# Patient Record
Sex: MALE | Race: WHITE | NOT HISPANIC OR LATINO | Employment: FULL TIME | URBAN - METROPOLITAN AREA
[De-identification: names, ages, dates, MRNs, and addresses within clinical notes are randomized per-mention and may not be internally consistent; named-entity substitution may affect disease eponyms.]

---

## 2018-11-02 ENCOUNTER — HOSPITAL ENCOUNTER (EMERGENCY)
Facility: HOSPITAL | Age: 48
Discharge: HOME | End: 2018-11-02
Attending: EMERGENCY MEDICINE | Admitting: EMERGENCY MEDICINE
Payer: COMMERCIAL

## 2018-11-02 VITALS
HEART RATE: 95 BPM | HEIGHT: 70 IN | SYSTOLIC BLOOD PRESSURE: 129 MMHG | DIASTOLIC BLOOD PRESSURE: 83 MMHG | WEIGHT: 170 LBS | OXYGEN SATURATION: 96 % | BODY MASS INDEX: 24.34 KG/M2 | TEMPERATURE: 98.1 F | RESPIRATION RATE: 16 BRPM

## 2018-11-02 DIAGNOSIS — S60.351A FOREIGN BODY OF RIGHT THUMB, INITIAL ENCOUNTER: Primary | ICD-10-CM

## 2018-11-02 PROCEDURE — 99283 EMERGENCY DEPT VISIT LOW MDM: CPT

## 2018-11-02 RX ORDER — CEPHALEXIN 250 MG/1
500 CAPSULE ORAL ONCE
Status: COMPLETED | OUTPATIENT
Start: 2018-11-02 | End: 2018-11-02

## 2018-11-02 RX ORDER — CEPHALEXIN 500 MG/1
500 CAPSULE ORAL EVERY 6 HOURS SCHEDULED
Qty: 40 CAPSULE | Refills: 0 | Status: SHIPPED | OUTPATIENT
Start: 2018-11-02 | End: 2018-11-12

## 2018-11-02 RX ORDER — LIDOCAINE HYDROCHLORIDE 20 MG/ML
10 INJECTION, SOLUTION EPIDURAL; INFILTRATION; INTRACAUDAL; PERINEURAL ONCE
Status: COMPLETED | OUTPATIENT
Start: 2018-11-02 | End: 2018-11-02

## 2018-11-02 RX ADMIN — LIDOCAINE HYDROCHLORIDE 10 ML: 20 INJECTION, SOLUTION EPIDURAL; INFILTRATION; INTRACAUDAL; PERINEURAL at 14:29

## 2018-11-02 RX ADMIN — CEPHALEXIN 500 MG: 250 CAPSULE ORAL at 14:29

## 2018-11-02 NOTE — DISCHARGE INSTRUCTIONS
Leave the wound open anything that wants to come out leg come out  Keflex every 6 hours to fight infection  Return increasing redness swelling, if the swelling continues to go up your arm  Return any problems    Cellulitis   WHAT YOU NEED TO KNOW:   Cellulitis is a skin infection caused by bacteria  Cellulitis may go away on its own or you may need treatment  Your healthcare provider may draw a [place] around the outside edges of your cellulitis  If your cellulitis spreads, your healthcare provider will see it outside of the [place]  DISCHARGE INSTRUCTIONS:   Call 911 if:   · You have sudden trouble breathing or chest pain  Return to the emergency department if:   · Your wound gets larger and more painful  · You feel a crackling under your skin when you touch it  · You have purple dots or bumps on your skin, or you see bleeding under your skin  · You have new swelling and pain in your legs  · The red, warm, swollen area gets larger  · You see red streaks coming from the infected area  Contact your healthcare provider if:   · You have a fever  · Your fever or pain does not go away or gets worse  · The area does not get smaller after 2 days of antibiotics  · Your skin is flaking or peeling off  · You have questions or concerns about your condition or care  Medicines:   · Antibiotics  help treat the bacterial infection  · NSAIDs , such as ibuprofen, help decrease swelling, pain, and fever  NSAIDs can cause stomach bleeding or kidney problems in certain people  If you take blood thinner medicine, always ask if NSAIDs are safe for you  Always read the medicine label and follow directions  Do not give these medicines to children under 10months of age without direction from your child's healthcare provider  · Acetaminophen  decreases pain and fever  It is available without a doctor's order  Ask how much to take and how often to take it  Follow directions   Read the labels of all other medicines you are using to see if they also contain acetaminophen, or ask your doctor or pharmacist  Acetaminophen can cause liver damage if not taken correctly  Do not use more than 4 grams (4,000 milligrams) total of acetaminophen in one day  · Take your medicine as directed  Contact your healthcare provider if you think your medicine is not helping or if you have side effects  Tell him or her if you are allergic to any medicine  Keep a list of the medicines, vitamins, and herbs you take  Include the amounts, and when and why you take them  Bring the list or the pill bottles to follow-up visits  Carry your medicine list with you in case of an emergency  Self-care:   · Elevate the area above the level of your heart  as often as you can  This will help decrease swelling and pain  Prop the area on pillows or blankets to keep it elevated comfortably  · Clean the area daily until the wound scabs over  Gently wash the area with soap and water  Pat dry  Use dressings as directed  · Place cool or warm, wet cloths on the area as directed  Use clean cloths and clean water  Leave it on the area until the cloth is room temperature  Pat the area dry with a clean, dry cloth  The cloths may help decrease pain  Prevent cellulitis:   · Do not scratch bug bites or areas of injury  You increase your risk for cellulitis by scratching these areas  · Do not share personal items, such as towels, clothing, and razors  · Clean exercise equipment  with germ-killing  before and after you use it  · Wash your hands often  Use soap and water  Wash your hands after you use the bathroom, change a child's diapers, or sneeze  Wash your hands before you prepare or eat food  Use lotion to prevent dry, cracked skin  · Wear pressure stockings as directed  You may be told to wear the stockings if you have peripheral edema  The stockings improve blood flow and decrease swelling      · Treat athlete's foot   This can help prevent the spread of a bacterial skin infection  Follow up with your healthcare provider within 3 days, or as directed: Your healthcare provider will check if your cellulitis is getting better  You may need different medicine  Write down your questions so you remember to ask them during your visits  © 2017 [employer] Information is for End User's use only and may not be sold, redistributed or otherwise used for commercial purposes  All illustrations and images included in CareNotes® are the copyrighted property of [employer] , [employer]  or [place]  The above information is an  only  It is not intended as medical advice for individual conditions or treatments  Talk to your doctor, nurse or pharmacist before following any medical regimen to see if it is safe and effective for you

## 2018-11-02 NOTE — ED NOTES
Discharge instructions and medications reviewed with pt  Pt verbalized understanding, with no further questions at this time  Pt ambulatory out of department, using steady gait, without assistance, with wife       Joshua Davila RN  11/02/18 1666

## 2019-06-20 ENCOUNTER — APPOINTMENT (EMERGENCY)
Dept: CT IMAGING | Facility: HOSPITAL | Age: 49
End: 2019-06-20
Payer: COMMERCIAL

## 2019-06-20 ENCOUNTER — HOSPITAL ENCOUNTER (EMERGENCY)
Facility: HOSPITAL | Age: 49
Discharge: HOME | End: 2019-06-20
Attending: EMERGENCY MEDICINE | Admitting: EMERGENCY MEDICINE
Payer: COMMERCIAL

## 2019-06-20 VITALS
WEIGHT: 169.97 LBS | RESPIRATION RATE: 16 BRPM | HEART RATE: 70 BPM | DIASTOLIC BLOOD PRESSURE: 79 MMHG | BODY MASS INDEX: 24.33 KG/M2 | HEIGHT: 70 IN | TEMPERATURE: 98 F | OXYGEN SATURATION: 97 % | SYSTOLIC BLOOD PRESSURE: 126 MMHG

## 2019-06-20 DIAGNOSIS — N20.0 KIDNEY STONE: Primary | ICD-10-CM

## 2019-06-20 DIAGNOSIS — N13.30 HYDRONEPHROSIS: ICD-10-CM

## 2019-06-20 DIAGNOSIS — R31.9 HEMATURIA: ICD-10-CM

## 2019-06-20 LAB
ALBUMIN SERPL BCP-MCNC: 3.9 G/DL (ref 3.5–5)
ALP SERPL-CCNC: 63 U/L (ref 46–116)
ALT SERPL W P-5'-P-CCNC: 25 U/L (ref 12–78)
ANION GAP SERPL CALCULATED.3IONS-SCNC: 11 MMOL/L (ref 4–13)
APTT PPP: 29 SECONDS (ref 26–38)
AST SERPL W P-5'-P-CCNC: 23 U/L (ref 5–45)
BACTERIA UR QL AUTO: ABNORMAL /HPF
BASOPHILS # BLD AUTO: 0.05 THOUSANDS/ΜL (ref 0–0.1)
BASOPHILS NFR BLD AUTO: 1 % (ref 0–1)
BILIRUB DIRECT SERPL-MCNC: 0.21 MG/DL (ref 0–0.2)
BILIRUB SERPL-MCNC: 1 MG/DL (ref 0.2–1)
BILIRUB UR QL STRIP: ABNORMAL
BUN SERPL-MCNC: 15 MG/DL (ref 5–25)
CALCIUM SERPL-MCNC: 9.3 MG/DL (ref 8.3–10.1)
CHLORIDE SERPL-SCNC: 103 MMOL/L (ref 100–108)
CLARITY UR: ABNORMAL
CO2 SERPL-SCNC: 24 MMOL/L (ref 21–32)
COLOR UR: YELLOW
CREAT SERPL-MCNC: 1.34 MG/DL (ref 0.6–1.3)
EOSINOPHIL # BLD AUTO: 0.04 THOUSAND/ΜL (ref 0–0.61)
EOSINOPHIL NFR BLD AUTO: 0 % (ref 0–6)
ERYTHROCYTE [DISTWIDTH] IN BLOOD BY AUTOMATED COUNT: 12.7 % (ref 11.6–15.1)
GFR SERPL CREATININE-BSD FRML MDRD: 61 ML/MIN/1.73SQ M
GLUCOSE SERPL-MCNC: 112 MG/DL (ref 65–140)
GLUCOSE UR STRIP-MCNC: NEGATIVE MG/DL
HCT VFR BLD AUTO: 44.9 % (ref 36.5–49.3)
HGB BLD-MCNC: 15.7 G/DL (ref 12–17)
HGB UR QL STRIP.AUTO: ABNORMAL
IMM GRANULOCYTES # BLD AUTO: 0.03 THOUSAND/UL (ref 0–0.2)
IMM GRANULOCYTES NFR BLD AUTO: 0 % (ref 0–2)
INR PPP: 1.1 (ref 0.86–1.17)
KETONES UR STRIP-MCNC: ABNORMAL MG/DL
LEUKOCYTE ESTERASE UR QL STRIP: NEGATIVE
LIPASE SERPL-CCNC: 86 U/L (ref 73–393)
LYMPHOCYTES # BLD AUTO: 1.14 THOUSANDS/ΜL (ref 0.6–4.47)
LYMPHOCYTES NFR BLD AUTO: 13 % (ref 14–44)
MCH RBC QN AUTO: 35.4 PG (ref 26.8–34.3)
MCHC RBC AUTO-ENTMCNC: 35 G/DL (ref 31.4–37.4)
MCV RBC AUTO: 101 FL (ref 82–98)
MONOCYTES # BLD AUTO: 0.68 THOUSAND/ΜL (ref 0.17–1.22)
MONOCYTES NFR BLD AUTO: 8 % (ref 4–12)
MUCOUS THREADS UR QL AUTO: ABNORMAL
NEUTROPHILS # BLD AUTO: 7.01 THOUSANDS/ΜL (ref 1.85–7.62)
NEUTS SEG NFR BLD AUTO: 78 % (ref 43–75)
NITRITE UR QL STRIP: NEGATIVE
NON-SQ EPI CELLS URNS QL MICRO: ABNORMAL /HPF
NRBC BLD AUTO-RTO: 0 /100 WBCS
PH UR STRIP.AUTO: 6.5 [PH]
PLATELET # BLD AUTO: 195 THOUSANDS/UL (ref 149–390)
PMV BLD AUTO: 9.6 FL (ref 8.9–12.7)
POTASSIUM SERPL-SCNC: 3.9 MMOL/L (ref 3.5–5.3)
PROT SERPL-MCNC: 7.5 G/DL (ref 6.4–8.2)
PROT UR STRIP-MCNC: ABNORMAL MG/DL
PROTHROMBIN TIME: 14.1 SECONDS (ref 11.8–14.2)
RBC # BLD AUTO: 4.44 MILLION/UL (ref 3.88–5.62)
RBC #/AREA URNS AUTO: ABNORMAL /HPF
SODIUM SERPL-SCNC: 138 MMOL/L (ref 136–145)
SP GR UR STRIP.AUTO: 1.02 (ref 1–1.03)
UROBILINOGEN UR QL STRIP.AUTO: 1 E.U./DL
WBC # BLD AUTO: 8.95 THOUSAND/UL (ref 4.31–10.16)
WBC #/AREA URNS AUTO: ABNORMAL /HPF

## 2019-06-20 PROCEDURE — 99284 EMERGENCY DEPT VISIT MOD MDM: CPT

## 2019-06-20 PROCEDURE — 81001 URINALYSIS AUTO W/SCOPE: CPT | Performed by: EMERGENCY MEDICINE

## 2019-06-20 PROCEDURE — 85025 COMPLETE CBC W/AUTO DIFF WBC: CPT | Performed by: EMERGENCY MEDICINE

## 2019-06-20 PROCEDURE — 96360 HYDRATION IV INFUSION INIT: CPT

## 2019-06-20 PROCEDURE — 99284 EMERGENCY DEPT VISIT MOD MDM: CPT | Performed by: EMERGENCY MEDICINE

## 2019-06-20 PROCEDURE — 74176 CT ABD & PELVIS W/O CONTRAST: CPT

## 2019-06-20 PROCEDURE — 80076 HEPATIC FUNCTION PANEL: CPT | Performed by: EMERGENCY MEDICINE

## 2019-06-20 PROCEDURE — 36415 COLL VENOUS BLD VENIPUNCTURE: CPT | Performed by: EMERGENCY MEDICINE

## 2019-06-20 PROCEDURE — 85610 PROTHROMBIN TIME: CPT | Performed by: EMERGENCY MEDICINE

## 2019-06-20 PROCEDURE — 83690 ASSAY OF LIPASE: CPT | Performed by: EMERGENCY MEDICINE

## 2019-06-20 PROCEDURE — 80048 BASIC METABOLIC PNL TOTAL CA: CPT | Performed by: EMERGENCY MEDICINE

## 2019-06-20 PROCEDURE — 85730 THROMBOPLASTIN TIME PARTIAL: CPT | Performed by: EMERGENCY MEDICINE

## 2019-06-20 RX ORDER — OXYCODONE HYDROCHLORIDE AND ACETAMINOPHEN 5; 325 MG/1; MG/1
1 TABLET ORAL EVERY 4 HOURS PRN
Qty: 15 TABLET | Refills: 0 | Status: SHIPPED | OUTPATIENT
Start: 2019-06-20 | End: 2019-06-28 | Stop reason: HOSPADM

## 2019-06-20 RX ORDER — TAMSULOSIN HYDROCHLORIDE 0.4 MG/1
0.4 CAPSULE ORAL
Qty: 10 CAPSULE | Refills: 0 | Status: SHIPPED | OUTPATIENT
Start: 2019-06-20

## 2019-06-20 RX ORDER — OXYCODONE HYDROCHLORIDE AND ACETAMINOPHEN 5; 325 MG/1; MG/1
1 TABLET ORAL ONCE
Status: COMPLETED | OUTPATIENT
Start: 2019-06-20 | End: 2019-06-20

## 2019-06-20 RX ADMIN — OXYCODONE HYDROCHLORIDE AND ACETAMINOPHEN 1 TABLET: 5; 325 TABLET ORAL at 09:36

## 2019-06-20 RX ADMIN — SODIUM CHLORIDE 1000 ML: 0.9 INJECTION, SOLUTION INTRAVENOUS at 09:42

## 2019-06-26 ENCOUNTER — APPOINTMENT (EMERGENCY)
Dept: CT IMAGING | Facility: HOSPITAL | Age: 49
End: 2019-06-26
Payer: COMMERCIAL

## 2019-06-26 ENCOUNTER — HOSPITAL ENCOUNTER (OUTPATIENT)
Facility: HOSPITAL | Age: 49
Setting detail: OBSERVATION
Discharge: HOME | End: 2019-06-28
Attending: EMERGENCY MEDICINE | Admitting: GENERAL PRACTICE
Payer: COMMERCIAL

## 2019-06-26 DIAGNOSIS — R10.9 FLANK PAIN: ICD-10-CM

## 2019-06-26 DIAGNOSIS — N17.9 AKI (ACUTE KIDNEY INJURY) (HCC): ICD-10-CM

## 2019-06-26 DIAGNOSIS — N13.30 HYDRONEPHROSIS: ICD-10-CM

## 2019-06-26 DIAGNOSIS — N20.1 URETEROLITHIASIS: Primary | ICD-10-CM

## 2019-06-26 PROBLEM — N20.0 NEPHROLITHIASIS: Status: ACTIVE | Noted: 2019-06-26

## 2019-06-26 LAB
ALBUMIN SERPL BCP-MCNC: 4.1 G/DL (ref 3.5–5)
ALP SERPL-CCNC: 72 U/L (ref 46–116)
ALT SERPL W P-5'-P-CCNC: 23 U/L (ref 12–78)
ANION GAP SERPL CALCULATED.3IONS-SCNC: 14 MMOL/L (ref 4–13)
AST SERPL W P-5'-P-CCNC: 26 U/L (ref 5–45)
BACTERIA UR QL AUTO: ABNORMAL /HPF
BASOPHILS # BLD AUTO: 0.08 THOUSANDS/ΜL (ref 0–0.1)
BASOPHILS NFR BLD AUTO: 1 % (ref 0–1)
BILIRUB SERPL-MCNC: 0.8 MG/DL (ref 0.2–1)
BILIRUB UR QL STRIP: NEGATIVE
BUN SERPL-MCNC: 26 MG/DL (ref 5–25)
CALCIUM SERPL-MCNC: 9.7 MG/DL (ref 8.3–10.1)
CAOX CRY URNS QL MICRO: ABNORMAL /HPF
CHLORIDE SERPL-SCNC: 98 MMOL/L (ref 100–108)
CLARITY UR: ABNORMAL
CO2 SERPL-SCNC: 21 MMOL/L (ref 21–32)
COLOR UR: YELLOW
CREAT SERPL-MCNC: 2.01 MG/DL (ref 0.6–1.3)
EOSINOPHIL # BLD AUTO: 0.14 THOUSAND/ΜL (ref 0–0.61)
EOSINOPHIL NFR BLD AUTO: 1 % (ref 0–6)
ERYTHROCYTE [DISTWIDTH] IN BLOOD BY AUTOMATED COUNT: 12.3 % (ref 11.6–15.1)
GFR SERPL CREATININE-BSD FRML MDRD: 38 ML/MIN/1.73SQ M
GLUCOSE SERPL-MCNC: 125 MG/DL (ref 65–140)
GLUCOSE UR STRIP-MCNC: NEGATIVE MG/DL
HCT VFR BLD AUTO: 48.3 % (ref 36.5–49.3)
HGB BLD-MCNC: 17 G/DL (ref 12–17)
HGB UR QL STRIP.AUTO: ABNORMAL
IMM GRANULOCYTES # BLD AUTO: 0.07 THOUSAND/UL (ref 0–0.2)
IMM GRANULOCYTES NFR BLD AUTO: 1 % (ref 0–2)
KETONES UR STRIP-MCNC: ABNORMAL MG/DL
LEUKOCYTE ESTERASE UR QL STRIP: ABNORMAL
LIPASE SERPL-CCNC: 137 U/L (ref 73–393)
LYMPHOCYTES # BLD AUTO: 1.7 THOUSANDS/ΜL (ref 0.6–4.47)
LYMPHOCYTES NFR BLD AUTO: 12 % (ref 14–44)
MCH RBC QN AUTO: 34.9 PG (ref 26.8–34.3)
MCHC RBC AUTO-ENTMCNC: 35.2 G/DL (ref 31.4–37.4)
MCV RBC AUTO: 99 FL (ref 82–98)
MONOCYTES # BLD AUTO: 1.33 THOUSAND/ΜL (ref 0.17–1.22)
MONOCYTES NFR BLD AUTO: 9 % (ref 4–12)
NEUTROPHILS # BLD AUTO: 11.25 THOUSANDS/ΜL (ref 1.85–7.62)
NEUTS SEG NFR BLD AUTO: 76 % (ref 43–75)
NITRITE UR QL STRIP: NEGATIVE
NON-SQ EPI CELLS URNS QL MICRO: ABNORMAL /HPF
NRBC BLD AUTO-RTO: 0 /100 WBCS
PH UR STRIP.AUTO: 6.5 [PH]
PLATELET # BLD AUTO: 262 THOUSANDS/UL (ref 149–390)
PMV BLD AUTO: 9.8 FL (ref 8.9–12.7)
POTASSIUM SERPL-SCNC: 4.6 MMOL/L (ref 3.5–5.3)
PROT SERPL-MCNC: 8 G/DL (ref 6.4–8.2)
PROT UR STRIP-MCNC: ABNORMAL MG/DL
RBC # BLD AUTO: 4.87 MILLION/UL (ref 3.88–5.62)
RBC #/AREA URNS AUTO: ABNORMAL /HPF
SODIUM SERPL-SCNC: 133 MMOL/L (ref 136–145)
SP GR UR STRIP.AUTO: 1.02 (ref 1–1.03)
UROBILINOGEN UR QL STRIP.AUTO: 0.2 E.U./DL
WBC # BLD AUTO: 14.57 THOUSAND/UL (ref 4.31–10.16)
WBC #/AREA URNS AUTO: ABNORMAL /HPF

## 2019-06-26 PROCEDURE — 36415 COLL VENOUS BLD VENIPUNCTURE: CPT | Performed by: EMERGENCY MEDICINE

## 2019-06-26 PROCEDURE — 96376 TX/PRO/DX INJ SAME DRUG ADON: CPT

## 2019-06-26 PROCEDURE — 96361 HYDRATE IV INFUSION ADD-ON: CPT

## 2019-06-26 PROCEDURE — 99285 EMERGENCY DEPT VISIT HI MDM: CPT | Performed by: EMERGENCY MEDICINE

## 2019-06-26 PROCEDURE — 80053 COMPREHEN METABOLIC PANEL: CPT | Performed by: EMERGENCY MEDICINE

## 2019-06-26 PROCEDURE — 83690 ASSAY OF LIPASE: CPT | Performed by: EMERGENCY MEDICINE

## 2019-06-26 PROCEDURE — 85025 COMPLETE CBC W/AUTO DIFF WBC: CPT | Performed by: EMERGENCY MEDICINE

## 2019-06-26 PROCEDURE — 81001 URINALYSIS AUTO W/SCOPE: CPT | Performed by: EMERGENCY MEDICINE

## 2019-06-26 PROCEDURE — 99220 PR INITIAL OBSERVATION CARE/DAY 70 MINUTES: CPT | Performed by: GENERAL PRACTICE

## 2019-06-26 PROCEDURE — 96374 THER/PROPH/DIAG INJ IV PUSH: CPT

## 2019-06-26 PROCEDURE — 99285 EMERGENCY DEPT VISIT HI MDM: CPT

## 2019-06-26 PROCEDURE — 74176 CT ABD & PELVIS W/O CONTRAST: CPT

## 2019-06-26 PROCEDURE — 96375 TX/PRO/DX INJ NEW DRUG ADDON: CPT

## 2019-06-26 RX ORDER — TAMSULOSIN HYDROCHLORIDE 0.4 MG/1
0.4 CAPSULE ORAL
Status: DISCONTINUED | OUTPATIENT
Start: 2019-06-26 | End: 2019-06-28 | Stop reason: HOSPADM

## 2019-06-26 RX ORDER — HYDROMORPHONE HCL/PF 1 MG/ML
1 SYRINGE (ML) INJECTION ONCE
Status: COMPLETED | OUTPATIENT
Start: 2019-06-26 | End: 2019-06-26

## 2019-06-26 RX ORDER — OXYCODONE HYDROCHLORIDE 10 MG/1
10 TABLET ORAL EVERY 4 HOURS PRN
Status: DISCONTINUED | OUTPATIENT
Start: 2019-06-26 | End: 2019-06-28 | Stop reason: HOSPADM

## 2019-06-26 RX ORDER — ACETAMINOPHEN 325 MG/1
650 TABLET ORAL EVERY 6 HOURS PRN
Status: DISCONTINUED | OUTPATIENT
Start: 2019-06-26 | End: 2019-06-28 | Stop reason: HOSPADM

## 2019-06-26 RX ORDER — NICOTINE 21 MG/24HR
1 PATCH, TRANSDERMAL 24 HOURS TRANSDERMAL DAILY
Status: DISCONTINUED | OUTPATIENT
Start: 2019-06-26 | End: 2019-06-26

## 2019-06-26 RX ORDER — NICOTINE 21 MG/24HR
21 PATCH, TRANSDERMAL 24 HOURS TRANSDERMAL ONCE
Status: COMPLETED | OUTPATIENT
Start: 2019-06-26 | End: 2019-06-27

## 2019-06-26 RX ORDER — SODIUM CHLORIDE 9 MG/ML
125 INJECTION, SOLUTION INTRAVENOUS CONTINUOUS
Status: DISCONTINUED | OUTPATIENT
Start: 2019-06-26 | End: 2019-06-27

## 2019-06-26 RX ORDER — ONDANSETRON 2 MG/ML
4 INJECTION INTRAMUSCULAR; INTRAVENOUS ONCE
Status: COMPLETED | OUTPATIENT
Start: 2019-06-26 | End: 2019-06-26

## 2019-06-26 RX ORDER — OXYCODONE HYDROCHLORIDE 5 MG/1
5 TABLET ORAL EVERY 4 HOURS PRN
Status: DISCONTINUED | OUTPATIENT
Start: 2019-06-26 | End: 2019-06-28 | Stop reason: HOSPADM

## 2019-06-26 RX ORDER — CALCIUM CARBONATE 200(500)MG
1000 TABLET,CHEWABLE ORAL DAILY PRN
Status: DISCONTINUED | OUTPATIENT
Start: 2019-06-26 | End: 2019-06-28 | Stop reason: HOSPADM

## 2019-06-26 RX ORDER — ONDANSETRON 2 MG/ML
4 INJECTION INTRAMUSCULAR; INTRAVENOUS EVERY 6 HOURS PRN
Status: DISCONTINUED | OUTPATIENT
Start: 2019-06-26 | End: 2019-06-28 | Stop reason: HOSPADM

## 2019-06-26 RX ORDER — DOCUSATE SODIUM 100 MG/1
100 CAPSULE, LIQUID FILLED ORAL 2 TIMES DAILY
Status: DISCONTINUED | OUTPATIENT
Start: 2019-06-26 | End: 2019-06-28 | Stop reason: HOSPADM

## 2019-06-26 RX ORDER — ATORVASTATIN CALCIUM 40 MG/1
40 TABLET, FILM COATED ORAL
Status: DISCONTINUED | OUTPATIENT
Start: 2019-06-26 | End: 2019-06-28 | Stop reason: HOSPADM

## 2019-06-26 RX ORDER — TAMSULOSIN HYDROCHLORIDE 0.4 MG/1
0.4 CAPSULE ORAL
Status: DISCONTINUED | OUTPATIENT
Start: 2019-06-26 | End: 2019-06-26

## 2019-06-26 RX ADMIN — DOCUSATE SODIUM 100 MG: 100 CAPSULE, LIQUID FILLED ORAL at 18:09

## 2019-06-26 RX ADMIN — HYDROMORPHONE HYDROCHLORIDE 1 MG: 1 INJECTION, SOLUTION INTRAMUSCULAR; INTRAVENOUS; SUBCUTANEOUS at 14:03

## 2019-06-26 RX ADMIN — OXYCODONE HYDROCHLORIDE 10 MG: 10 TABLET ORAL at 20:19

## 2019-06-26 RX ADMIN — MORPHINE SULFATE 2 MG: 2 INJECTION, SOLUTION INTRAMUSCULAR; INTRAVENOUS at 18:09

## 2019-06-26 RX ADMIN — SODIUM CHLORIDE 125 ML/HR: 0.9 INJECTION, SOLUTION INTRAVENOUS at 20:21

## 2019-06-26 RX ADMIN — SODIUM CHLORIDE 1000 ML: 0.9 INJECTION, SOLUTION INTRAVENOUS at 14:32

## 2019-06-26 RX ADMIN — ONDANSETRON 4 MG: 2 INJECTION INTRAMUSCULAR; INTRAVENOUS at 12:21

## 2019-06-26 RX ADMIN — ONDANSETRON 4 MG: 2 INJECTION INTRAMUSCULAR; INTRAVENOUS at 11:09

## 2019-06-26 RX ADMIN — SODIUM CHLORIDE 1000 ML: 0.9 INJECTION, SOLUTION INTRAVENOUS at 11:27

## 2019-06-26 RX ADMIN — HYDROMORPHONE HYDROCHLORIDE 1 MG: 1 INJECTION, SOLUTION INTRAMUSCULAR; INTRAVENOUS; SUBCUTANEOUS at 12:21

## 2019-06-26 RX ADMIN — CEFTRIAXONE SODIUM 1000 MG: 10 INJECTION, POWDER, FOR SOLUTION INTRAVENOUS at 16:31

## 2019-06-26 RX ADMIN — TAMSULOSIN HYDROCHLORIDE 0.4 MG: 0.4 CAPSULE ORAL at 16:36

## 2019-06-26 RX ADMIN — SODIUM CHLORIDE 100 ML/HR: 0.9 INJECTION, SOLUTION INTRAVENOUS at 15:43

## 2019-06-26 RX ADMIN — HYDROMORPHONE HYDROCHLORIDE 1 MG: 1 INJECTION, SOLUTION INTRAMUSCULAR; INTRAVENOUS; SUBCUTANEOUS at 11:09

## 2019-06-26 RX ADMIN — NICOTINE 21 MG: 21 PATCH TRANSDERMAL at 14:40

## 2019-06-26 RX ADMIN — ATORVASTATIN CALCIUM 40 MG: 40 TABLET, FILM COATED ORAL at 16:36

## 2019-06-26 NOTE — ED PROVIDER NOTES
History  Chief Complaint   Patient presents with    Abdominal Pain     Pt reports RLQ abd pain  +N/V     45 y/o male presents to the ED for right flank pain  Patient states that he initially developed pain 6 days ago and was seen here, diagnosed with a kidney stone and d/c home with pain meds  He states that he was unable to fill the prescriptions  Reports that pain did improve but then returned suddenly about 30 minutes prior to arrival  He states that it is a sharp/ stabbing pain  Has nausea and vomiting  Denies fevers or urinary symptoms  Denies any hx of kidney stones or surgical procedures  No other complaints at this time  History provided by:  Patient  Flank Pain   Pain location:  R flank  Pain radiates to:  RLQ  Pain severity:  Severe  Onset quality:  Sudden  Duration:  1 hour  Timing:  Constant  Progression:  Worsening  Chronicity:  New  Context: not previous surgeries and not sick contacts    Relieved by:  None tried  Worsened by:  Nothing  Ineffective treatments:  None tried  Associated symptoms: nausea and vomiting    Associated symptoms: no chest pain, no chills, no constipation, no cough, no diarrhea, no dysuria, no fever, no hematuria, no shortness of breath and no sore throat    Risk factors: has not had multiple surgeries        Prior to Admission Medications   Prescriptions Last Dose Informant Patient Reported? Taking?   oxyCODONE-acetaminophen (PERCOCET) 5-325 mg per tablet Not Taking at Unknown time  No No   Sig: Take 1 tablet by mouth every 4 (four) hours as needed for moderate pain for up to 15 dosesMax Daily Amount: 6 tablets   Patient not taking: Reported on 6/26/2019   tamsulosin (FLOMAX) 0 4 mg 6/25/2019 at Unknown time  No Yes   Sig: Take 1 capsule (0 4 mg total) by mouth daily with dinner      Facility-Administered Medications: None       Past Medical History:   Diagnosis Date    HIV (human immunodeficiency virus infection) ([facility] [address] )     Hyperlipidemia        History reviewed   No pertinent surgical history  History reviewed  No pertinent family history  I have reviewed and agree with the history as documented  Social History     Tobacco Use    Smoking status: Current Every Day Smoker     Packs/day: 0 50     Types: Cigarettes    Smokeless tobacco: Current User     Types: Chew   Substance Use Topics    Alcohol use: No    Drug use: No        Review of Systems   Constitutional: Negative for chills and fever  HENT: Negative for congestion, ear pain and sore throat  Eyes: Negative for pain and visual disturbance  Respiratory: Negative for cough, shortness of breath and wheezing  Cardiovascular: Negative for chest pain and leg swelling  Gastrointestinal: Positive for nausea and vomiting  Negative for abdominal pain, constipation and diarrhea  Genitourinary: Positive for flank pain  Negative for dysuria, frequency, hematuria and urgency  Musculoskeletal: Negative for neck pain and neck stiffness  Skin: Negative for rash and wound  Neurological: Negative for weakness, numbness and headaches  Psychiatric/Behavioral: Negative for agitation and confusion  All other systems reviewed and are negative  Physical Exam  Physical Exam   Constitutional: He is oriented to person, place, and time  He appears well-developed and well-nourished  He appears distressed  HENT:   Head: Normocephalic and atraumatic  Mouth/Throat: Oropharynx is clear and moist    Eyes: Pupils are equal, round, and reactive to light  EOM are normal    Neck: Normal range of motion  Neck supple  Cardiovascular: Normal rate and regular rhythm  Pulmonary/Chest: Effort normal and breath sounds normal  No stridor  No respiratory distress  He has no wheezes  He has no rales  Abdominal: Soft  Bowel sounds are normal  He exhibits no distension  There is tenderness in the right lower quadrant  There is CVA tenderness  There is no rigidity, no rebound and no guarding     Musculoskeletal: Normal range of motion  Neurological: He is alert and oriented to person, place, and time  No focal deficits   Skin: Skin is warm and dry  Nursing note and vitals reviewed        Vital Signs  ED Triage Vitals [06/26/19 1047]   Temperature Pulse Respirations Blood Pressure SpO2   98 9 °F (37 2 °C) 60 19 (!) 182/71 100 %      Temp Source Heart Rate Source Patient Position - Orthostatic VS BP Location FiO2 (%)   Oral Monitor Sitting Left arm --      Pain Score       Worst Possible Pain           Vitals:    06/26/19 1047 06/26/19 1353   BP: (!) 182/71 127/64   Pulse: 60 69   Patient Position - Orthostatic VS: Sitting Lying         Visual Acuity      ED Medications  Medications   nicotine (NICODERM CQ) 21 mg/24 hr TD 24 hr patch 21 mg (21 mg Transdermal Medication Applied 6/26/19 1440)   sodium chloride 0 9 % bolus 1,000 mL (1,000 mL Intravenous New Bag 6/26/19 1432)   sodium chloride 0 9 % infusion (100 mL/hr Intravenous New Bag 6/26/19 1543)   tamsulosin (FLOMAX) capsule 0 4 mg (has no administration in time range)   docusate sodium (COLACE) capsule 100 mg (has no administration in time range)   ondansetron (ZOFRAN) injection 4 mg (has no administration in time range)   calcium carbonate (TUMS) chewable tablet 1,000 mg (has no administration in time range)   acetaminophen (TYLENOL) tablet 650 mg (has no administration in time range)   oxyCODONE (ROXICODONE) IR tablet 5 mg (has no administration in time range)   oxyCODONE (ROXICODONE) immediate release tablet 10 mg (has no administration in time range)   morphine injection 2 mg (has no administration in time range)   atorvastatin (LIPITOR) tablet 40 mg (has no administration in time range)   bictegravir-emtricitab-tenofovir alafenamide (BIKTARVY) -25 MG tablet 1 tablet (has no administration in time range)   ceftriaxone (ROCEPHIN) 1 g/50 mL in dextrose IVPB (has no administration in time range)   sodium chloride 0 9 % bolus 1,000 mL (0 mL Intravenous Stopped 6/26/19 1340) ondansetron [facility]) injection 4 mg (4 mg Intravenous Given 6/26/19 1109)   HYDROmorphone (DILAUDID) injection 1 mg (1 mg Intravenous Given 6/26/19 1109)   HYDROmorphone (DILAUDID) injection 1 mg (1 mg Intravenous Given 6/26/19 1221)   ondansetron (ZOFRAN) injection 4 mg (4 mg Intravenous Given 6/26/19 1221)   HYDROmorphone (DILAUDID) injection 1 mg (1 mg Intravenous Given 6/26/19 1403)       Diagnostic Studies  Results Reviewed     Procedure Component Value Units Date/Time    Urine Microscopic [[id number]]  (Abnormal) Collected:  06/26/19 1318    Lab Status:  Final result Specimen:  Urine, Clean Catch Updated:  06/26/19 1425     RBC, UA 10-20 /hpf      WBC, UA 4-10 /hpf      Epithelial Cells Occasional /hpf      Bacteria, UA None Seen /hpf      Ca Oxalate Daija, UA Occasional /hpf     UA w Reflex to Microscopic w Reflex to Culture [[id number]]  (Abnormal) Collected:  06/26/19 1318    Lab Status:  Final result Specimen:  Urine, Clean Catch Updated:  06/26/19 1326     Color, UA Yellow     Clarity, UA Slightly Cloudy     Specific [place], UA 1 020     pH, UA 6 5     Leukocytes, UA Trace     Nitrite, UA Negative     Protein, UA Trace mg/dl      Glucose, UA Negative mg/dl      Ketones, UA 40 (2+) mg/dl      Urobilinogen, UA 0 2 E U /dl      Bilirubin, UA Negative     Blood, UA Large    CMP [[id number]]  (Abnormal) Collected:  06/26/19 1108    Lab Status:  Final result Specimen:  Blood from Arm, Left Updated:  06/26/19 1140     Sodium 133 mmol/L      Potassium 4 6 mmol/L      Chloride 98 mmol/L      CO2 21 mmol/L      ANION GAP 14 mmol/L      BUN 26 mg/dL      Creatinine 2 01 mg/dL      Glucose 125 mg/dL      Calcium 9 7 mg/dL      AST 26 U/L      ALT 23 U/L      Alkaline Phosphatase 72 U/L      Total Protein 8 0 g/dL      Albumin 4 1 g/dL      Total Bilirubin 0 80 mg/dL      eGFR 38 ml/min/1 73sq m     Narrative:       [place] guidelines for Chronic Kidney Disease (CKD):     Stage 1 with normal or high GFR (GFR > 90 mL/min/1 73 square meters)    Stage 2 Mild CKD (GFR = 60-89 mL/min/1 73 square meters)    Stage 3A Moderate CKD (GFR = 45-59 mL/min/1 73 square meters)    Stage 3B Moderate CKD (GFR = 30-44 mL/min/1 73 square meters)    Stage 4 Severe CKD (GFR = 15-29 mL/min/1 73 square meters)    Stage 5 End Stage CKD (GFR <15 mL/min/1 73 square meters)  Note: GFR calculation is accurate only with a steady state creatinine    Lipase [[id number]]  (Normal) Collected:  06/26/19 1108    Lab Status:  Final result Specimen:  Blood from Arm, Left Updated:  06/26/19 1140     Lipase 137 u/L     CBC and differential [[id number]]  (Abnormal) Collected:  06/26/19 1108    Lab Status:  Final result Specimen:  Blood from Arm, Left Updated:  06/26/19 1117     WBC 14 57 Thousand/uL      RBC 4 87 Million/uL      Hemoglobin 17 0 g/dL      Hematocrit 48 3 %      MCV 99 fL      MCH 34 9 pg      MCHC 35 2 g/dL      RDW 12 3 %      MPV 9 8 fL      Platelets 734 Thousands/uL      nRBC 0 /100 WBCs      Neutrophils Relative 76 %      Immat GRANS % 1 %      Lymphocytes Relative 12 %      Monocytes Relative 9 %      Eosinophils Relative 1 %      Basophils Relative 1 %      Neutrophils Absolute 11 25 Thousands/µL      Immature Grans Absolute 0 07 Thousand/uL      Lymphocytes Absolute 1 70 Thousands/µL      Monocytes Absolute 1 33 Thousand/µL      Eosinophils Absolute 0 14 Thousand/µL      Basophils Absolute 0 08 Thousands/µL                  CT abdomen pelvis wo contrast   Final Result by Manuela Reyes MD (06/26 1153)      1  Stable right-sided hydroureteronephrosis with slight migration of the distal ureteral calculus now at the ureterovesical junction  2   4 mm right middle lobe lung nodule, not imaged previously  Based on current Fleischner Society 2017 Guidelines on incidental pulmonary nodule, no routine follow-up is needed if the patient is considered low risk for lung cancer    If the patient is    considered high risk for lung cancer, 12 month follow-up non-contrast chest CT is recommended  The study was marked in [facility] for immediate notification  Workstation performed: [id number]                    Procedures  Procedures       ED Course  ED Course as of Jun 26 1621 Wed Jun 26, 2019   1324 Creatinine(!): 2 01   1327 WBC(!): 14 57   1408 Urology paged  MDM  Number of Diagnoses or Management Options  DANE (acute kidney injury) [facility]): new and requires workup  Hydronephrosis: new and requires workup  Ureterolithiasis: new and requires workup  Diagnosis management comments: Patient with right flank pain likely secondary to prior kidney stone that was recently diagnosed  Will get labs to assess kidney function and white count, UA to rule out UTI, and CT scan to assess kidney stone  Will give pain meds, antiemetics, and fluids- will reassess  Spoke with Kina Burns from urology- recommends admission to medicine and they will take patient to the OR in the morning  NPO at midnight     Patient reevaluated and feels improved  Patient updated on results of tests and plan of care including admission to hospital for further evaluation of presenting complaint  Patient demonstrates verbal understanding and agrees with plan  Report to Dr Gemma Acosta  with [facility] for continuation of patient care          Amount and/or Complexity of Data Reviewed  Clinical lab tests: ordered and reviewed  Tests in the radiology section of CPT®: ordered and reviewed  Tests in the medicine section of CPT®: ordered and reviewed  Discussion of test results with the performing providers: yes  Decide to obtain previous medical records or to obtain history from someone other than the patient: yes  Obtain history from someone other than the patient: yes  Review and summarize past medical records: yes  Discuss the patient with other providers: yes  Independent visualization of images, tracings, or specimens: yes    Patient Progress  Patient progress: improved      Disposition  Final diagnoses:   Ureterolithiasis   Hydronephrosis   DANE (acute kidney injury) ([facility] [address] )     Time reflects when diagnosis was documented in both MDM as applicable and the Disposition within this note     Time User Action Codes Description Comment    6/26/2019  2:37 PM Yumi Aditya, Eunice Highman A Add [N20 1] Ureterolithiasis     6/26/2019  2:37 PM Yumi Aditya, Eunice Highman A Add [N13 30] Hydronephrosis     6/26/2019  2:37 PM Margarita Zepedaraida Highman A Add [N17 9] DANE (acute kidney injury) ([facility] [address] )     6/26/2019  3:17 PM Jhony Mclean Modify [N17 9] DANE (acute kidney injury) [facility])       ED Disposition     ED Disposition Condition Date/Time Comment    Admit Stable Wed Jun 26, 2019  3:07 PM Case was discussed with ARIES and the patient's admission status was agreed to be Admission Status: observation status to the service of Dr Kevin Fagan   Follow-up Information    None         Patient's Medications   Discharge Prescriptions    No medications on file     No discharge procedures on file      ED Provider  Electronically Signed by           Sanjay Lynch DO  06/26/19 9673

## 2019-06-26 NOTE — ASSESSMENT & PLAN NOTE
Stable right-sided hydroureteronephrosis with slight migration of the distal ureteral calculus now at the ureterovesical junction  NPO at midnight  Urology consulted  P r n   Pain management

## 2019-06-26 NOTE — H&P
[facility] Internal Medicine  H&P- Balbina Paz 1970, 46 y o  male MRN: [id number]    Unit/Bed#: ED 17 Encounter: [id number]    Primary Care Provider: No primary care provider on file  Date and time admitted to hospital: 6/26/2019 10:50 AM    Nephrolithiasis  Assessment & Plan   Stable right-sided hydroureteronephrosis with slight migration of the distal ureteral calculus now at the ureterovesical junction  NPO at midnight  Urology consulted  P r n  Pain management    DANE (acute kidney injury) ([facility] [address] )  Assessment & Plan  Previous creatinine rate 1 34  2 01 on admission  IV fluids  BMP in a m  * Flank pain  Assessment & Plan  Likely related to kidney stone  Neurology following  NPO for intervention tomorrow  P r n  Pain management         VTE Prophylaxis: Ambulate patient  / sequential compression device   Code Status:  DNR  POLST: POLST form is not discussed and not completed at this time  Discussion with family:  Not available at this time    Anticipated Length of Stay:  Patient will be admitted on an Observation basis with an anticipated length of stay of  < 2 midnights  Justification for Hospital Stay:  Patient will require urological intervention for treatment of kidney stone, pain management, IV fluids for DANE    Total Time for Visit, including Counseling / Coordination of Care: 1 hour  Greater than 50% of this total time spent on direct patient counseling and coordination of care  Chief Complaint:   Flank pain    History of Present Illness:    Balbina Paz is a 46 y o  male who presents with right-sided flank pain after further evaluation patient was found to have ureteral calculi, neurology consulted  Patient also found to have DANE  Will require treatment for this  Patient came into the ED earlier in the week complaining of mild flank pain was discharged home with instructions for increased fluid intake follow-up and pain medication    Reports that the pain was not that bad and did not get pain medication filled  He was feeling better until today, late morning or early afternoon he had extremely sharp right-sided flank pain  He denies any illicit drug use, smokes pack cigarettes daily, and a 6 pack of beer every other week or so  Ambulates without any difficulty  Denies any chest pain chest tightness shortness of breath difficulty breathing  Denies any respiratory symptoms    Review of Systems:    Review of Systems   Constitutional: Positive for activity change  HENT: Negative  Respiratory: Negative  Cardiovascular: Negative  Gastrointestinal: Negative  Genitourinary: Positive for flank pain  All other systems reviewed and are negative  Past Medical and Surgical History:     Past Medical History:   Diagnosis Date    HIV (human immunodeficiency virus infection) ([facility] [address] )     Hyperlipidemia        History reviewed  No pertinent surgical history  Meds/Allergies:    Prior to Admission medications    Medication Sig Start Date End Date Taking? Authorizing Provider   tamsulosin (FLOMAX) 0 4 mg Take 1 capsule (0 4 mg total) by mouth daily with dinner 6/20/19  Yes Clifton Mckeon MD   oxyCODONE-acetaminophen (PERCOCET) 5-325 mg per tablet Take 1 tablet by mouth every 4 (four) hours as needed for moderate pain for up to 15 dosesMax Daily Amount: 6 tablets  Patient not taking: Reported on 6/26/2019 6/20/19   Clifton Mckeon MD     I have reviewed home medications with patient personally      Allergies: No Known Allergies    Social History:     Marital Status:    Occupation:  NA  Patient Pre-hospital Living Situation:  Private residence  Patient Pre-hospital Level of Mobility:  Independent  Patient Pre-hospital Diet Restrictions:  None  Substance Use History:   Social History     Substance and Sexual Activity   Alcohol Use No     Social History     Tobacco Use   Smoking Status Current Every Day Smoker    Packs/day: 0 50    Types: Cigarettes   Smokeless Tobacco Current User    Types: Chew     Social History     Substance and Sexual Activity   Drug Use No       Family History:    History reviewed  No pertinent family history  Physical Exam:     Vitals:   Blood Pressure: 127/64 (06/26/19 1353)  Pulse: 69 (06/26/19 1353)  Temperature: 98 9 °F (37 2 °C) (06/26/19 1047)  Temp Source: Oral (06/26/19 1047)  Respirations: 18 (06/26/19 1353)  Height: 5' 10" (177 8 cm) (06/26/19 1047)  Weight - Scale: 77 8 kg (171 lb 8 3 oz) (06/26/19 1047)  SpO2: 98 % (06/26/19 1353)    Physical Exam   Constitutional: He is oriented to person, place, and time  He appears well-developed and well-nourished  HENT:   Head: Normocephalic  Eyes: Pupils are equal, round, and reactive to light  Neck: Normal range of motion  Cardiovascular: Normal rate  Pulmonary/Chest: Effort normal and breath sounds normal    Abdominal: Soft  Bowel sounds are normal  There is tenderness  Neurological: He is alert and oriented to person, place, and time  Skin: Skin is warm and dry  Psychiatric: He has a normal mood and affect  His behavior is normal  Judgment and thought content normal    Nursing note and vitals reviewed  Additional Data:     Lab Results: I have personally reviewed pertinent reports  Results from last 7 days   Lab Units 06/26/19  1108   WBC Thousand/uL 14 57*   HEMOGLOBIN g/dL 17 0   HEMATOCRIT % 48 3   PLATELETS Thousands/uL 262   NEUTROS PCT % 76*   LYMPHS PCT % 12*   MONOS PCT % 9   EOS PCT % 1     Results from last 7 days   Lab Units 06/26/19  1108   SODIUM mmol/L 133*   POTASSIUM mmol/L 4 6   CHLORIDE mmol/L 98*   CO2 mmol/L 21   BUN mg/dL 26*   CREATININE mg/dL 2 01*   ANION GAP mmol/L 14*   CALCIUM mg/dL 9 7   ALBUMIN g/dL 4 1   TOTAL BILIRUBIN mg/dL 0 80   ALK PHOS U/L 72   ALT U/L 23   AST U/L 26   GLUCOSE RANDOM mg/dL 125     Results from last 7 days   Lab Units 06/20/19  0941   INR  1 10                   Imaging: I have personally reviewed pertinent reports        CT abdomen pelvis wo contrast   Final Result by Benedict Bynum MD (06/26 8437)      1  Stable right-sided hydroureteronephrosis with slight migration of the distal ureteral calculus now at the ureterovesical junction  2   4 mm right middle lobe lung nodule, not imaged previously  Based on current Fleischner Society 2017 Guidelines on incidental pulmonary nodule, no routine follow-up is needed if the patient is considered low risk for lung cancer  If the patient is    considered high risk for lung cancer, 12 month follow-up non-contrast chest CT is recommended  The study was marked in [facility] for immediate notification  Workstation performed: [id number]             EKG, Pathology, and Other Studies Reviewed on Admission:   · EKG:  Not available    Allscripts / Epic Records Reviewed: Yes     ** Please Note: This note has been constructed using a voice recognition system   **

## 2019-06-26 NOTE — ASSESSMENT & PLAN NOTE
Likely related to kidney stone  Neurology following  NPO for intervention tomorrow  P r n   Pain management

## 2019-06-26 NOTE — ED NOTES
Pt resting comfortable, when pt wakes up pt becomes very agitated and starts yelling demanding more pain medications and that the "doctor better hurry the fuck up because this is ridiculous "  [facility] aware, orders for more diluadid placed       Elizabeth Magallon RN  06/26/19 6016

## 2019-06-27 ENCOUNTER — APPOINTMENT (OUTPATIENT)
Dept: RADIOLOGY | Facility: HOSPITAL | Age: 49
End: 2019-06-27
Payer: COMMERCIAL

## 2019-06-27 ENCOUNTER — ANESTHESIA (OUTPATIENT)
Dept: PERIOP | Facility: HOSPITAL | Age: 49
End: 2019-06-27
Payer: COMMERCIAL

## 2019-06-27 ENCOUNTER — ANESTHESIA EVENT (OUTPATIENT)
Dept: PERIOP | Facility: HOSPITAL | Age: 49
End: 2019-06-27
Payer: COMMERCIAL

## 2019-06-27 ENCOUNTER — TELEPHONE (OUTPATIENT)
Dept: UROLOGY | Facility: CLINIC | Age: 49
End: 2019-06-27

## 2019-06-27 PROBLEM — E78.5 HYPERLIPIDEMIA: Status: ACTIVE | Noted: 2019-06-27

## 2019-06-27 PROBLEM — Z21 HIV (HUMAN IMMUNODEFICIENCY VIRUS INFECTION) (HCC): Status: ACTIVE | Noted: 2019-06-27

## 2019-06-27 PROBLEM — R91.1 PULMONARY NODULE: Status: ACTIVE | Noted: 2019-06-27

## 2019-06-27 PROBLEM — B20 HIV (HUMAN IMMUNODEFICIENCY VIRUS INFECTION) (HCC): Status: ACTIVE | Noted: 2019-06-27

## 2019-06-27 LAB
ANION GAP SERPL CALCULATED.3IONS-SCNC: 9 MMOL/L (ref 4–13)
BUN SERPL-MCNC: 18 MG/DL (ref 5–25)
CALCIUM SERPL-MCNC: 8.6 MG/DL (ref 8.3–10.1)
CHLORIDE SERPL-SCNC: 105 MMOL/L (ref 100–108)
CO2 SERPL-SCNC: 23 MMOL/L (ref 21–32)
CREAT SERPL-MCNC: 1.56 MG/DL (ref 0.6–1.3)
ERYTHROCYTE [DISTWIDTH] IN BLOOD BY AUTOMATED COUNT: 12.6 % (ref 11.6–15.1)
GFR SERPL CREATININE-BSD FRML MDRD: 51 ML/MIN/1.73SQ M
GLUCOSE P FAST SERPL-MCNC: 112 MG/DL (ref 65–99)
GLUCOSE SERPL-MCNC: 112 MG/DL (ref 65–140)
HCT VFR BLD AUTO: 42.7 % (ref 36.5–49.3)
HGB BLD-MCNC: 14.8 G/DL (ref 12–17)
MCH RBC QN AUTO: 35.1 PG (ref 26.8–34.3)
MCHC RBC AUTO-ENTMCNC: 34.7 G/DL (ref 31.4–37.4)
MCV RBC AUTO: 101 FL (ref 82–98)
PLATELET # BLD AUTO: 200 THOUSANDS/UL (ref 149–390)
PMV BLD AUTO: 9.6 FL (ref 8.9–12.7)
POTASSIUM SERPL-SCNC: 3.7 MMOL/L (ref 3.5–5.3)
RBC # BLD AUTO: 4.22 MILLION/UL (ref 3.88–5.62)
SODIUM SERPL-SCNC: 137 MMOL/L (ref 136–145)
WBC # BLD AUTO: 10.22 THOUSAND/UL (ref 4.31–10.16)

## 2019-06-27 PROCEDURE — 99244 OFF/OP CNSLTJ NEW/EST MOD 40: CPT | Performed by: UROLOGY

## 2019-06-27 PROCEDURE — 85027 COMPLETE CBC AUTOMATED: CPT | Performed by: NURSE PRACTITIONER

## 2019-06-27 PROCEDURE — 87086 URINE CULTURE/COLONY COUNT: CPT | Performed by: UROLOGY

## 2019-06-27 PROCEDURE — 52332 CYSTOSCOPY AND TREATMENT: CPT | Performed by: UROLOGY

## 2019-06-27 PROCEDURE — 80048 BASIC METABOLIC PNL TOTAL CA: CPT | Performed by: NURSE PRACTITIONER

## 2019-06-27 PROCEDURE — 82360 CALCULUS ASSAY QUANT: CPT | Performed by: UROLOGY

## 2019-06-27 PROCEDURE — 52352 CYSTOURETERO W/STONE REMOVE: CPT | Performed by: UROLOGY

## 2019-06-27 PROCEDURE — NC001 PR NO CHARGE: Performed by: UROLOGY

## 2019-06-27 PROCEDURE — 99225 PR SBSQ OBSERVATION CARE/DAY 25 MINUTES: CPT | Performed by: INTERNAL MEDICINE

## 2019-06-27 PROCEDURE — C2617 STENT, NON-COR, TEM W/O DEL: HCPCS | Performed by: UROLOGY

## 2019-06-27 PROCEDURE — 74420 UROGRAPHY RTRGR +-KUB: CPT

## 2019-06-27 PROCEDURE — C1769 GUIDE WIRE: HCPCS | Performed by: UROLOGY

## 2019-06-27 PROCEDURE — C1758 CATHETER, URETERAL: HCPCS | Performed by: UROLOGY

## 2019-06-27 DEVICE — INLAY OPTIMA URETERAL STENT W/O GUIDEWIRE
Type: IMPLANTABLE DEVICE | Site: URETER | Status: FUNCTIONAL
Brand: BARD® INLAY OPTIMA® URETERAL STENT

## 2019-06-27 RX ORDER — KETOROLAC TROMETHAMINE 30 MG/ML
INJECTION, SOLUTION INTRAMUSCULAR; INTRAVENOUS AS NEEDED
Status: DISCONTINUED | OUTPATIENT
Start: 2019-06-27 | End: 2019-06-27 | Stop reason: SURG

## 2019-06-27 RX ORDER — ONDANSETRON 2 MG/ML
4 INJECTION INTRAMUSCULAR; INTRAVENOUS ONCE AS NEEDED
Status: DISCONTINUED | OUTPATIENT
Start: 2019-06-27 | End: 2019-06-27 | Stop reason: HOSPADM

## 2019-06-27 RX ORDER — OXYCODONE HYDROCHLORIDE AND ACETAMINOPHEN 5; 325 MG/1; MG/1
1 TABLET ORAL EVERY 6 HOURS PRN
Qty: 10 TABLET | Refills: 0 | Status: SHIPPED | OUTPATIENT
Start: 2019-06-27 | End: 2019-07-02

## 2019-06-27 RX ORDER — OXYBUTYNIN CHLORIDE 5 MG/1
5 TABLET ORAL 2 TIMES DAILY PRN
Qty: 14 TABLET | Refills: 0 | Status: SHIPPED | OUTPATIENT
Start: 2019-06-27 | End: 2019-07-04

## 2019-06-27 RX ORDER — FENTANYL CITRATE/PF 50 MCG/ML
25 SYRINGE (ML) INJECTION
Status: DISCONTINUED | OUTPATIENT
Start: 2019-06-27 | End: 2019-06-27 | Stop reason: HOSPADM

## 2019-06-27 RX ORDER — MAGNESIUM HYDROXIDE 1200 MG/15ML
LIQUID ORAL AS NEEDED
Status: DISCONTINUED | OUTPATIENT
Start: 2019-06-27 | End: 2019-06-27 | Stop reason: HOSPADM

## 2019-06-27 RX ORDER — MIDAZOLAM HYDROCHLORIDE 1 MG/ML
INJECTION INTRAMUSCULAR; INTRAVENOUS AS NEEDED
Status: DISCONTINUED | OUTPATIENT
Start: 2019-06-27 | End: 2019-06-27 | Stop reason: SURG

## 2019-06-27 RX ORDER — SODIUM CHLORIDE 9 MG/ML
100 INJECTION, SOLUTION INTRAVENOUS CONTINUOUS
Status: DISCONTINUED | OUTPATIENT
Start: 2019-06-27 | End: 2019-06-28 | Stop reason: HOSPADM

## 2019-06-27 RX ORDER — SODIUM CHLORIDE 9 MG/ML
INJECTION, SOLUTION INTRAVENOUS CONTINUOUS PRN
Status: DISCONTINUED | OUTPATIENT
Start: 2019-06-27 | End: 2019-06-27

## 2019-06-27 RX ORDER — DEXAMETHASONE SODIUM PHOSPHATE 10 MG/ML
INJECTION, SOLUTION INTRAMUSCULAR; INTRAVENOUS AS NEEDED
Status: DISCONTINUED | OUTPATIENT
Start: 2019-06-27 | End: 2019-06-27 | Stop reason: SURG

## 2019-06-27 RX ORDER — ATORVASTATIN CALCIUM 10 MG/1
10 TABLET, FILM COATED ORAL DAILY
COMMUNITY

## 2019-06-27 RX ORDER — PROPOFOL 10 MG/ML
INJECTION, EMULSION INTRAVENOUS AS NEEDED
Status: DISCONTINUED | OUTPATIENT
Start: 2019-06-27 | End: 2019-06-27 | Stop reason: SURG

## 2019-06-27 RX ORDER — ONDANSETRON 2 MG/ML
INJECTION INTRAMUSCULAR; INTRAVENOUS AS NEEDED
Status: DISCONTINUED | OUTPATIENT
Start: 2019-06-27 | End: 2019-06-27 | Stop reason: SURG

## 2019-06-27 RX ORDER — CEFAZOLIN SODIUM 2 G/50ML
2000 SOLUTION INTRAVENOUS ONCE
Status: COMPLETED | OUTPATIENT
Start: 2019-06-27 | End: 2019-06-27

## 2019-06-27 RX ORDER — NICOTINE 21 MG/24HR
21 PATCH, TRANSDERMAL 24 HOURS TRANSDERMAL ONCE
Status: DISCONTINUED | OUTPATIENT
Start: 2019-06-27 | End: 2019-06-28 | Stop reason: HOSPADM

## 2019-06-27 RX ORDER — CEPHALEXIN 500 MG/1
500 CAPSULE ORAL EVERY 12 HOURS SCHEDULED
Qty: 6 CAPSULE | Refills: 0 | Status: SHIPPED | OUTPATIENT
Start: 2019-06-27 | End: 2019-06-30

## 2019-06-27 RX ORDER — FENTANYL CITRATE 50 UG/ML
INJECTION, SOLUTION INTRAMUSCULAR; INTRAVENOUS AS NEEDED
Status: DISCONTINUED | OUTPATIENT
Start: 2019-06-27 | End: 2019-06-27 | Stop reason: SURG

## 2019-06-27 RX ORDER — DOCUSATE SODIUM 100 MG/1
100 CAPSULE, LIQUID FILLED ORAL 3 TIMES DAILY
Qty: 21 CAPSULE | Refills: 0 | Status: SHIPPED | OUTPATIENT
Start: 2019-06-27 | End: 2019-07-04

## 2019-06-27 RX ADMIN — CEFAZOLIN SODIUM 2000 MG: 2 SOLUTION INTRAVENOUS at 12:55

## 2019-06-27 RX ADMIN — FENTANYL CITRATE 25 MCG: 50 INJECTION, SOLUTION INTRAMUSCULAR; INTRAVENOUS at 14:11

## 2019-06-27 RX ADMIN — SODIUM CHLORIDE 125 ML/HR: 0.9 INJECTION, SOLUTION INTRAVENOUS at 12:37

## 2019-06-27 RX ADMIN — ATORVASTATIN CALCIUM 40 MG: 40 TABLET, FILM COATED ORAL at 15:48

## 2019-06-27 RX ADMIN — TAMSULOSIN HYDROCHLORIDE 0.4 MG: 0.4 CAPSULE ORAL at 15:48

## 2019-06-27 RX ADMIN — OXYCODONE HYDROCHLORIDE 10 MG: 10 TABLET ORAL at 07:48

## 2019-06-27 RX ADMIN — SODIUM CHLORIDE 100 ML/HR: 0.9 INJECTION, SOLUTION INTRAVENOUS at 19:09

## 2019-06-27 RX ADMIN — KETOROLAC TROMETHAMINE 15 MG: 30 INJECTION, SOLUTION INTRAMUSCULAR at 13:23

## 2019-06-27 RX ADMIN — LIDOCAINE HYDROCHLORIDE 80 MG: 20 INJECTION, SOLUTION INTRAVENOUS at 12:58

## 2019-06-27 RX ADMIN — PROPOFOL 20 MG: 10 INJECTION, EMULSION INTRAVENOUS at 13:25

## 2019-06-27 RX ADMIN — BICTEGRAVIR SODIUM, EMTRICITABINE, AND TENOFOVIR ALAFENAMIDE FUMARATE 1 TABLET: 50; 200; 25 TABLET ORAL at 08:57

## 2019-06-27 RX ADMIN — FENTANYL CITRATE 25 MCG: 50 INJECTION, SOLUTION INTRAMUSCULAR; INTRAVENOUS at 14:00

## 2019-06-27 RX ADMIN — FENTANYL CITRATE 25 MCG: 50 INJECTION, SOLUTION INTRAMUSCULAR; INTRAVENOUS at 14:06

## 2019-06-27 RX ADMIN — FENTANYL CITRATE 25 MCG: 50 INJECTION, SOLUTION INTRAMUSCULAR; INTRAVENOUS at 13:26

## 2019-06-27 RX ADMIN — DOCUSATE SODIUM 100 MG: 100 CAPSULE, LIQUID FILLED ORAL at 18:39

## 2019-06-27 RX ADMIN — FENTANYL CITRATE 50 MCG: 50 INJECTION, SOLUTION INTRAMUSCULAR; INTRAVENOUS at 13:02

## 2019-06-27 RX ADMIN — DEXAMETHASONE SODIUM PHOSPHATE 4 MG: 10 INJECTION, SOLUTION INTRAMUSCULAR; INTRAVENOUS at 13:04

## 2019-06-27 RX ADMIN — SODIUM CHLORIDE: 0.9 INJECTION, SOLUTION INTRAVENOUS at 12:45

## 2019-06-27 RX ADMIN — NICOTINE 21 MG: 21 PATCH TRANSDERMAL at 19:08

## 2019-06-27 RX ADMIN — ONDANSETRON 4 MG: 2 INJECTION INTRAMUSCULAR; INTRAVENOUS at 12:58

## 2019-06-27 RX ADMIN — FENTANYL CITRATE 50 MCG: 50 INJECTION, SOLUTION INTRAMUSCULAR; INTRAVENOUS at 12:58

## 2019-06-27 RX ADMIN — DOCUSATE SODIUM 100 MG: 100 CAPSULE, LIQUID FILLED ORAL at 08:57

## 2019-06-27 RX ADMIN — PROPOFOL 180 MG: 10 INJECTION, EMULSION INTRAVENOUS at 12:58

## 2019-06-27 RX ADMIN — MIDAZOLAM HYDROCHLORIDE 2 MG: 1 INJECTION, SOLUTION INTRAMUSCULAR; INTRAVENOUS at 12:55

## 2019-06-27 NOTE — DISCHARGE INSTRUCTIONS
Ureteroscopy   WHAT YOU NEED TO KNOW:    Milady Durant,    Today you had ureteroscopy and stone basketing in which I used a small wire basket to help remove the stone from your distal ureter on the right  I then shot some dye into the kidney and placed a ureteral stent, the string of which is exiting the tip of your penis  Please do not have intercourse or masturbate with this tube in place  We will remove this tube on Monday/early in the next week, You will then need a KUB and renal ultrasound following that (an x-ray and ultrasound to look for swelling in the kidney)  It is normal to have urgency, frequency, discomfort when you urinate, and pain in your side when you urinate after surgery such as this  In terms of work, most people want to take 1 week off of work after surgery like this, although this is not strictly required if you feel that your up to working  Please do not drive or operate heavy machinery while taking pain medication  If you have questions or concerns in the postoperative time frame, please do not hesitate to contact my office  Take care and be well,  Dr Murillo ureteroscopy is a procedure to examine in the inside of your urinary tract, which includes your urethra, bladder, ureters, and kidneys  A ureteroscope is a small, thin tube with a light and camera on the end  Ureteroscopy can help your healthcare provider diagnose and treat problems in your urinary tract, such as kidney stones  DISCHARGE INSTRUCTIONS:   Medicine:   · Antibiotics  may be given to treat or prevent an infection  · Take your medicine as directed  Contact your healthcare provider if you think your medicine is not helping or if you have side effects  Tell him or her if you are allergic to any medicine  Keep a list of the medicines, vitamins, and herbs you take  Include the amounts, and when and why you take them  Bring the list or the pill bottles to follow-up visits   Carry your medicine list with you in case of an emergency  Follow up with your healthcare provider as directed:  Write down your questions so you remember to ask them during your visits  Drink liquids as directed  Liquids can help prevent kidney stones and urinary tract infections  Drink water and limit the amount of caffeine you drink  Caffeine may be found in coffee, tea, soda, sports drinks, and foods  Ask your healthcare provider how much liquid to drink each day  Contact your healthcare provider if:   · You have a fever  · You cannot urinate  · You have blood in your urine  · You are vomiting  · You have pain in your abdomen or side  · You have questions or concerns about your condition or care  © 2017 [facility] Information is for End User's use only and may not be sold, redistributed or otherwise used for commercial purposes  All illustrations and images included in CareNotes® are the copyrighted property of A D A M [employer] Inc  or [place]  The above information is an  only  It is not intended as medical advice for individual conditions or treatments  Talk to your doctor, nurse or pharmacist before following any medical regimen to see if it is safe and effective for you

## 2019-06-27 NOTE — CONSULTS
CONSULT    Patient Name: Balbina Paz  Patient MRN: [id number]  Date of Service: 6/27/2019   Date / Time Note Created: 6/27/2019 7:28 AM   Referring Provider: Kala Wilks, Roseline    Provider Creating Note: RAMIN Cotto    PCP: No primary care provider on file  Attending Provider:  Kala Wilks, *    Reason for Consult: Flank Pain    HPI:  Balbina Paz is a 78-year-old male presenting to [facility] AT [facility] Emergency room 6/20 with a chief urologic complaint of right flank pain; not accompanied by fever, chills dysuria or gross hematuria  CT confirmed 4 mm distal ureteral calculus with resultant hydronephrosis  Pain was adequately controlled and patient was nontoxic  Therefore, he was deemed eligible for medical expulsive therapy  He re-presented yesterday evening to emergency room with similar symptomatology and inability to control pain  Repeat labs demonstrated acute kidney injury with interval creatinine increase from 1 3 prior to above 2  Patient was admitted to Internal Medicine for IV fluids and pain control  Patient remains afebrile, hemodynamically stable  He reports persistent flank pain, urinary urgency and frequency without dysuria, fever, chills or severe nausea/vomiting  Urologic consultation was requested for further management recommendations  Active Problems:    Patient Active Problem List   Diagnosis    Flank pain    DANE (acute kidney injury) ([facility] [address] )    Nephrolithiasis    Ureterolithiasis            Impressions  Right Ureteral Calculus  R Hydronephrosis 2/2 previous  DANE  Renal Colic    Recommendations  1  Initiate aggressive IVFs   2  Flomax  3  Analgesia/Narcotics   4  Anti-emetics   5  ATBs empirically while awaiting culture   6  Strain urine   7  NPO for OR if no spontaneous expulsion achieved  Explained risk, benefits and potential complications of ureteroscopic stone extraction   Patient has verbalized understanding of possible need for ureteral stent only for any signs of infection and/or technical difficult prohibiting stone retrieval etc ; requiring staged ureteroscopy electively once recovered and infection free as OP  Formal consent by surgeon  Past Medical History:   Diagnosis Date    HIV (human immunodeficiency virus infection) ([facility] [address] )     Hyperlipidemia        History reviewed  No pertinent surgical history  History reviewed  No pertinent family history      Social History     Socioeconomic History    Marital status:      Spouse name: Not on file    Number of children: Not on file    Years of education: Not on file    Highest education level: Not on file   Occupational History    Not on file   Social Needs    Financial resource strain: Not on file    Food insecurity:     Worry: Not on file     Inability: Not on file    Transportation needs:     Medical: Not on file     Non-medical: Not on file   Tobacco Use    Smoking status: Current Every Day Smoker     Packs/day: 0 50     Types: Cigarettes    Smokeless tobacco: Current User     Types: Chew   Substance and Sexual Activity    Alcohol use: No    Drug use: No    Sexual activity: Not on file   Lifestyle    Physical activity:     Days per week: Not on file     Minutes per session: Not on file    Stress: Not on file   Relationships    Social connections:     Talks on phone: Not on file     Gets together: Not on file     Attends Baptism service: Not on file     Active member of club or organization: Not on file     Attends meetings of clubs or organizations: Not on file     Relationship status: Not on file    Intimate partner violence:     Fear of current or ex partner: Not on file     Emotionally abused: Not on file     Physically abused: Not on file     Forced sexual activity: Not on file   Other Topics Concern    Not on file   Social History Narrative    Not on file       No Known Allergies    Review of Systems  10 point review of systems negative except as noted in HPI  Constitutional:   negative for - chills or fever  Cardiovascular:   no chest pain or dyspnea on exertion  Gastrointestinal:   positive for - abdominal pain  Genito-Urinary:   positive for - urinary frequency/urgency  Neurological:   no TIA or stroke symptoms     Chart Review   Allergies, current medications, history, problem list    Vital Signs  /76 (BP Location: Left arm)   Pulse 77   Temp 98 °F (36 7 °C) (Oral)   Resp 18   Ht 5' 10" (1 778 m)   Wt 75 7 kg (166 lb 14 2 oz)   SpO2 97%   BMI 23 95 kg/m²     Physical Exam  General appearance: alert and oriented, in no acute distress, appears stated age, cooperative and no distress  Head: Normocephalic, without obvious abnormality, atraumatic  Neck: no adenopathy, no carotid bruit, no JVD, supple, symmetrical, trachea midline and thyroid not enlarged, symmetric, no tenderness/mass/nodules  Lungs: clear to auscultation bilaterally  Heart: regular rate and rhythm, S1, S2 normal, no murmur, click, rub or gallop  Abdomen: abnormal findings:  mild tenderness in the lower abdomen  Extremities: extremities normal, warm and well-perfused; no cyanosis, clubbing, or edema  Pulses: 2+ and symmetric  Neurologic: Grossly normal  No urinary drains     Laboratory Studies  Lab Results   Component Value Date    K 3 7 06/27/2019     06/27/2019    CO2 23 06/27/2019    CREATININE 1 56 (H) 06/27/2019    BUN 18 06/27/2019     Lab Results   Component Value Date    WBC 10 22 (H) 06/27/2019    RBC 4 22 06/27/2019    HGB 14 8 06/27/2019    HCT 42 7 06/27/2019     (H) 06/27/2019    MCH 35 1 (H) 06/27/2019    RDW 12 6 06/27/2019     06/27/2019         Imaging and Other Studies  )  Ct Abdomen Pelvis Wo Contrast    Result Date: 6/26/2019  Narrative: CT ABDOMEN AND PELVIS WITHOUT IV CONTRAST INDICATION:   right sided abd pain- recent diagnosis of kidney stone  COMPARISON:  None   TECHNIQUE:  CT examination of the abdomen and pelvis was performed without intravenous contrast   Axial, sagittal, and coronal 2D reformatted images were created from the source data and submitted for interpretation  Radiation dose length product (DLP) for this visit:  455 mGy-cm   This examination, like all CT scans performed in the [facility], was performed utilizing techniques to minimize radiation dose exposure, including the use of iterative reconstruction and automated exposure control  Enteric contrast was not administered in accordance with physician request  FINDINGS: ABDOMEN LOWER CHEST:  There is a 4 mm right middle lobe nodule on series 2, image 2  This area was not imaged on the prior CT  LIVER/BILIARY TREE:  Unremarkable  GALLBLADDER:  No calcified gallstones  No pericholecystic inflammatory change  SPLEEN:  Unremarkable  PANCREAS:  Unremarkable  ADRENAL GLANDS:  Unremarkable  KIDNEYS/URETERS:  There is stable hydroureteronephrosis secondary to a 4 mm calculus at the right ureterovesical junction  There has been minimal migration from the prior study  There are small bilateral nonobstructing renal calculi measuring up to 3 mm  STOMACH AND BOWEL:  Unremarkable  APPENDIX:  No findings to suggest appendicitis  ABDOMINOPELVIC CAVITY:  No ascites or free intraperitoneal air  No lymphadenopathy  VESSELS:  Unremarkable for patient's age  PELVIS REPRODUCTIVE ORGANS:  The right testicle is retractile located in the inguinal canal on the current exam, previously in the scrotum  URINARY BLADDER:  Unremarkable  ABDOMINAL WALL/INGUINAL REGIONS:  Unremarkable  OSSEOUS STRUCTURES:  No acute fracture or destructive osseous lesion  Impression: 1  Stable right-sided hydroureteronephrosis with slight migration of the distal ureteral calculus now at the ureterovesical junction  2   4 mm right middle lobe lung nodule, not imaged previously   Based on current Fleischner Society 2017 Guidelines on incidental pulmonary nodule, no routine follow-up is needed if the patient is considered low risk for lung cancer  If the patient is considered high risk for lung cancer, 12 month follow-up non-contrast chest CT is recommended  The study was marked in [facility] for immediate notification  Workstation performed: [id number]     Ct Abdomen Pelvis Wo Contrast    Result Date: 6/20/2019  Narrative: CT ABDOMEN AND PELVIS WITHOUT IV CONTRAST INDICATION:   Flank pain, stone disease suspected  COMPARISON:  None  TECHNIQUE:  CT examination of the abdomen and pelvis was performed without intravenous contrast   Axial, sagittal, and coronal 2D reformatted images were created from the source data and submitted for interpretation  Radiation dose length product (DLP) for this visit:  436 mGy-cm   This examination, like all CT scans performed in the [facility], was performed utilizing techniques to minimize radiation dose exposure, including the use of iterative reconstruction and automated exposure control  Enteric contrast was not administered  FINDINGS: ABDOMEN LOWER CHEST:  Atelectatic changes are noted at the lung bases  LIVER/BILIARY TREE:  Unremarkable  GALLBLADDER:  No calcified gallstones  No pericholecystic inflammatory change  SPLEEN:  Unremarkable  PANCREAS:  Unremarkable  ADRENAL GLANDS:  Unremarkable  KIDNEYS/URETERS:  4 mm obstructing calculus in the distal 3rd portion of the right ureter near the ureterovesical junction causing moderate right hydroureteronephrosis  There is diffuse right periureteral inflammatory fat stranding  A few nonobstructing calculi in the lower poles of the kidneys are noted measuring up to 3 mm on the right and 5 mm on the left  STOMACH AND BOWEL:  Unremarkable  APPENDIX:  A normal appendix was visualized  ABDOMINOPELVIC CAVITY:  No ascites or free intraperitoneal air  No lymphadenopathy  VESSELS:  Unremarkable for patient's age   PELVIS REPRODUCTIVE ORGANS:  The prostate is not enlarged and contains several dystrophic calcifications centrally  URINARY BLADDER:  Unremarkable  ABDOMINAL WALL/INGUINAL REGIONS:  1 5 cm intramuscular lipoma in the left lateral abdominal wall  OSSEOUS STRUCTURES:  No acute fracture or destructive osseous lesion  Impression: 4 mm obstructing calculus in the distal 3rd portion of the right ureter near the ureterovesical junction causing moderate right hydroureteronephrosis  There is associated diffuse periureteral inflammation  Bilateral nonobstructing renal calculi  The study was marked in [facility] for immediate notification  Workstation performed: [id number]       Medications     Current Facility-Administered Medications:  acetaminophen 650 mg Oral Q6H PRN RAMIN Anderson    atorvastatin 40 mg Oral Daily With GetO2RAMIN    bictegravir-emtricitab-tenofovir alafenamide 1 tablet Oral Daily With Breakfast RAMIN Anderson    calcium carbonate 1,000 mg Oral Daily PRN RAMIN Anderson    cefTRIAXone 1,000 mg Intravenous Q24H RAMIN Anderson Last Rate: Stopped (06/26/19 1829)   docusate sodium 100 mg Oral BID RAMIN Anderson    morphine injection 2 mg Intravenous Q1H PRN RAMIN Anderson    nicotine 21 mg Transdermal Once [employer], DO    ondansetron 4 mg Intravenous Q6H PRN RAMIN Anderson    oxyCODONE 10 mg Oral Q4H PRN RAMIN Anderson    oxyCODONE 5 mg Oral Q4H PRN RAMIN Anderson    sodium chloride 125 mL/hr Intravenous Continuous Adam Saravia MD Last Rate: 125 mL/hr (06/26/19 2021)   tamsulosin 0 4 mg Oral Daily With Dinner RAMIN Anderson          Total time spent with patient 30minutes, >50% spent counseling and/or coordination of care           RAMIN Sosa

## 2019-06-27 NOTE — H&P
The patient was seen and examined  There are no changes from the prior inpatient history and physical examination  The patient is appropriately prepared for surgery today as planned      Procedure right ureteroscopic stone intervention with laser lithotripsy    /74   Pulse 65   Temp 99 8 °F (37 7 °C) (Temporal)   Resp 20   Ht 5' 10" (1 778 m)   Wt 75 7 kg (166 lb 14 2 oz)   SpO2 97%   BMI 23 95 kg/m²

## 2019-06-27 NOTE — OP NOTE
OPERATIVE REPORT  PATIENT NAME: German Morrison    :  1970  MRN: [id number]  Pt Location: [facility] OR ROOM 04    SURGERY DATE: 2019    Surgeon(s) and Role:     * Mayito Garcia MD - Primary    Preop Diagnosis:  Ureterolithiasis [N20 1]    Post-Op Diagnosis Codes:     * Ureterolithiasis [N20 1]  Coronal hypospadias    Procedure(s) (LRB):  CYSTOSCOPY URETEROSCOPY WITH LITHOTRIPSY HOLMIUM LASER, RETROGRADE PYELOGRAM AND INSERTION STENT URETERAL WITH STONE BASKETTING (Right)    Specimen(s):  ID Type Source Tests Collected by Time Destination   A : Right Ureteral Calculi Calculus Ureter, Right STONE ANALYSIS Mayito Garcia MD 2019 1242        Estimated Blood Loss:   Minimal    Drains:  Ureteral Drain/Stent Right ureter 6 Fr  (Active)   Number of days: 0       Anesthesia Type:   General    Operative Indications:  Ureterolithiasis [N20 1]  Right flank pain    Operative Findings:  Intramural stone on the right, proximal hydronephrosis is noted, stone basketing form, right ureteral stent on a string was placed    Complications:   None    Procedure and Technique:      PROCEDURES PERFORMED:  1) Cystoscopy  2) Right retrograde pyelography with fluoroscopic interpretation  3) Right ureteroscopy with basket extraction of stone  4) Right ureteral stent placement (6F x 26cm    SURGEON:  Mayito Garcia MD    ASSISTANTS:  None    NOTE:  There were no qualified teaching residents to assist with this case    ANESTHESIA: General     COMPLICATIONS:   None    ANTIBIOTICS:  Ancef    INTRAOPERATIVE THROMBOEMBOLISM PROPHYLAXIS:  Pneumatic compression stockings         FINDINGS:    1  The Right calculus was not radiopaque on plain fluoroscopy  2  Retrograde pyelogram was performed on the Right side using a 5 Fr open ended catheter  2 of 50% dilute Isovue was injected  3  The following findings were noted: Mild hydroureteronephrosis  Mildly dilated calyces  No filling defects    Contrast drained out of the collecting system  INDICATIONS FOR PROCEDURE:  Alex Andres is an 46 y o  old male with Right ureteral calculus  After discussing the options for treatment, including medical expulsive therapy, extracorporeal shockwave lithotripsy, and ureteroscopy, the patient elected to undergo ureteroscopy and ureteral stent placement  We discussed the procedure in detail, the alternatives, and the risks, and they signed informed consent to proceed (these are outlined in the surgical consent form)  PROCEDURE IN DETAIL:     The patient was identified by name, date of birth, and MRN  and brought to the OR  Antibiotic prophylaxis and DVT prophylaxis were administered as per the guidelines  They were placed in the dorsal lithotomy position with care to pad all pressure points  They were prepped and draped in the usual sterile fashion  A surgical time out was performed with all in the room in agreement with the correct patient, procedure, indications, and laterality  A 21-Swedish rigid cystoscope was used to enter the bladder after gentle dilation of the hypospadiac urethral meatus located at the corona to 22 [place]  The bladder was inspected in its entirety and there were no lesions noted  The ureteral orifices were identified in their normal orthotopic positions  The Right ureteral orifice was identified and a 5 Fr open ended catheter was placed into the ureteral orifice  A retrograde pyelogram was performed with the findings as described above  A Solo wire was advanced up to the kidney under fluoroscopic guidance  Leaving this safety wire in place, the bladder was drained  A "7 5 Fr semi-rigid ureteroscope was advanced up the ureter under vision   The stone was encountered in the distal ureter location  The stone was not noted to be impacted  A 1 9 Swedish zero tip nitinol basket was passed through the ureteroscope was used to basket the stone for removal after multiple attempts with success    T The ureteroscope was backed down the ureter under vision and there were no residual fragments and the ureter was noted to be intact with no injury and mild edema where the stone was located  A 6 Lebanese by 26 centimeter right JJ stent was then passed up the wire  under fluoroscopic guidance into the kidney with a good curl noted in the kidney and in the bladder  The stent string was not removed  The bladder was drained  All instrument counts and sponge counts were correct  The patient was placed back into the supine position, awakened from general anesthesia and brought to recovery room in stable condition  ESTIMATED BLOOD LOSS:  Minimal      DRAINS:   Ureteral Drain/Stent Right ureter 6 Fr  (Active)       SPECIMENS:   Order Name Source Comment Collection Info Order Time   STONE ANALYSIS Ureter, Right  Collected By: Addison Neri MD 6/27/2019  1:33 PM        IMPLANTS:   Implant Name Type Inv   Item Serial No   Lot No  LRB No  Used   URETERAL STENT 6 FR X 26 CM OPTIMA INLAY - [id number]  URETERAL STENT 6 FR X 26 CM OPTIMA INLAY  [employer] [id number] Right 1        COMPLICATIONS:  None    DISPOSITION: PACU     PLAN:  Patient will return for ureteral stent removal on Monday, will then get a 6 weeks KUB and renal ultrasound and follow-up with an advanced practitioner     I was present for the entire procedure and A qualified resident physician was not available    Patient Disposition:  PACU     SIGNATURE: Addison Neri MD  DATE: June 27, 2019  TIME: 1:34 PM

## 2019-06-27 NOTE — PLAN OF CARE
Problem: DISCHARGE PLANNING - CARE MANAGEMENT  Goal: Discharge to post-acute care or home with appropriate resources  Description  INTERVENTIONS:  - Conduct assessment to determine patient/family and health care team treatment goals, and need for post-acute services based on payer coverage, community resources, and patient preferences, and barriers to discharge  - Address psychosocial, clinical, and financial barriers to discharge as identified in assessment in conjunction with the patient/family and health care team  - Arrange appropriate level of post-acute services according to patient's   needs and preference and payer coverage in collaboration with the physician and health care team  - Communicate with and update the patient/family, physician, and health care team regarding progress on the discharge plan  - Arrange appropriate transportation to post-acute venues   Outcome: Progressing  Note:   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  He uses no DME's  He has used OP/PT in the past following a fracture, but does not remember the name of the agency  He has never used [facility] services  Denies substance abuse or mental health issues  He is a smoker PPD-1/2 x 4 years  Prior to that he chewed tobacco   He uses [facility] and has no problem with co-pays  He does not have a PCP  CM offered to make an appoint for him, but he refuses  CM supplied "Need a Family Doctor?" for his review  He has an Advanced Directive and POA, but it has to be updated because his ex wife Aaliyah Hughes is still listed as his POA  He works and drives  His friend Carmenza Brizuela or girlfriend Ivy Martin will transport home when he is medically cleared    CM discussed d/c needs and none were identified  Pt plans on returning to work as soon as he is discharged  CM reviewed discharge planning process including the following: identifying help at home, patient preference for discharge planning needs, pharmacy preference, and availability of treatment team to discuss questions or concerns patient and/or family may have regarding understanding medications and recognizing signs and symptoms once discharged  CM also encouraged patient to follow up with all recommended appointments after discharge  Patient advised of importance for patient and family to participate in managing patients medical well being  CM name and role reviewed  Discharge Checklist reviewed and CM will continue to monitor for progress toward discharge goals in nursing and provider rounds

## 2019-06-27 NOTE — PROGRESS NOTES
Progress Note - Lavelle Núñez 1970, 46 y o  male MRN: [id number]    Unit/Bed#:  Encounter: [id number]    Primary Care Provider: No primary care provider on file  Date and time admitted to hospital: 6/26/2019 10:50 AM        Nephrolithiasis  Assessment & Plan   Stable right-sided hydroureteronephrosis with slight migration of the distal ureteral calculus now at the ureterovesical junction  IV fluid hydration  Pain control  Flomax  Discussed with Urology recommendations appreciated  Patient scheduled for OR today    * Flank pain  Assessment & Plan  Likely related to kidney stone  Urology following  Pain control  IV fluid  Patient is scheduled for intervention today    HIV (human immunodeficiency virus infection) ([facility] )  Assessment & Plan  Continue home medications  Outpatient follow-up    Pulmonary nodule  Assessment & Plan  Outpatient follow-up with PCP    Hyperlipidemia  Assessment & Plan  Continue statin    DANE (acute kidney injury) ([facility] )  Assessment & Plan  Patient likely has CKD, Previous creatinine rate 1 34  2 01 on admission, improved to 1 5 today  Continue IV fluid hydration  Recheck creatinine tomorrow      VTE Pharmacologic Prophylaxis:   Pharmacologic: Heparin  Mechanical VTE Prophylaxis in Place: Yes    Patient Centered Rounds: I have performed bedside rounds with nursing staff today  Discussions with Specialists or Other Care Team Provider:  Urology    Education and Discussions with Family / Patient:  Patient    Time Spent for Care: 30 minutes  More than 50% of total time spent on counseling and coordination of care as described above      Current Length of Stay: 0 day(s)    Current Patient Status: Observation   Certification Statement: The patient will continue to require additional inpatient hospital stay due to Patient is scheduled for surgery    Discharge Plan:  Continue hospitalization for OR by Urology today    Code Status: Level 1 - Full Code      Subjective:   Patient seen and examined  Currently feels better  Denies any pain at this time  No nausea or vomiting  Objective:     Vitals:   Temp (24hrs), Av 5 °F (36 9 °C), Min:97 8 °F (36 6 °C), Max:99 2 °F (37 3 °C)    Temp:  [97 8 °F (36 6 °C)-99 2 °F (37 3 °C)] 97 8 °F (36 6 °C)  HR:  [60-77] 70  Resp:  [16-19] 18  BP: (121-182)/(64-82) 121/78  SpO2:  [96 %-100 %] 97 %  Body mass index is 23 95 kg/m²  Input and Output Summary (last 24 hours): Intake/Output Summary (Last 24 hours) at 2019 8401  Last data filed at 2019 0800  Gross per 24 hour   Intake 4025 ml   Output 200 ml   Net 3825 ml       Physical Exam:     Physical Exam   Constitutional: He is oriented to person, place, and time  He appears well-developed and well-nourished  HENT:   Head: Normocephalic and atraumatic  Eyes: Pupils are equal, round, and reactive to light  EOM are normal    Neck: Normal range of motion  Neck supple  Cardiovascular: Normal rate and regular rhythm  Pulmonary/Chest: Effort normal and breath sounds normal    Abdominal: Soft  Bowel sounds are normal    Musculoskeletal: Normal range of motion  Neurological: He is alert and oriented to person, place, and time  Skin: Skin is warm and dry         Additional Data:     Labs:    Results from last 7 days   Lab Units 19  0457 19  1108   WBC Thousand/uL 10 22* 14 57*   HEMOGLOBIN g/dL 14 8 17 0   HEMATOCRIT % 42 7 48 3   PLATELETS Thousands/uL 200 262   NEUTROS PCT %  --  76*   LYMPHS PCT %  --  12*   MONOS PCT %  --  9   EOS PCT %  --  1     Results from last 7 days   Lab Units 19  0457 19  1108   SODIUM mmol/L 137 133*   POTASSIUM mmol/L 3 7 4 6   CHLORIDE mmol/L 105 98*   CO2 mmol/L 23 21   BUN mg/dL 18 26*   CREATININE mg/dL 1 56* 2 01*   ANION GAP mmol/L 9 14*   CALCIUM mg/dL 8 6 9 7   ALBUMIN g/dL  --  4 1   TOTAL BILIRUBIN mg/dL  --  0 80   ALK PHOS U/L  --  72   ALT U/L  --  23   AST U/L  --  26   GLUCOSE RANDOM mg/dL 112 125     Results from last 7 days   Lab Units 06/20/19  0941   INR  1 10                       * I Have Reviewed All Lab Data Listed Above  * Additional Pertinent Lab Tests Reviewed: Andrew 64 Admission Reviewed    Imaging:    Imaging Reports Reviewed Today Include:   Imaging Personally Reviewed by Myself Includes:  CT of the abdomen pelvis without contrast    Recent Cultures (last 7 days):           Last 24 Hours Medication List:     Current Facility-Administered Medications:  acetaminophen 650 mg Oral Q6H PRN Fatuma Guest, CRNP    atorvastatin 40 mg Oral Daily With [employer], CRNP    bictegravir-emtricitab-tenofovir alafenamide 1 tablet Oral Daily With Breakfast Fatuma Guest, CRNP    calcium carbonate 1,000 mg Oral Daily PRN Fatuma Guest, CRNP    cefTRIAXone 1,000 mg Intravenous Q24H Fatuma Guest, ROSAMARIANP Last Rate: Stopped (06/26/19 1829)   docusate sodium 100 mg Oral BID Fatuma Guest, CRNP    morphine injection 2 mg Intravenous Q1H PRN Fatuma Guest, CRNP    nicotine 21 mg Transdermal Once Sussy Ligas,     ondansetron 4 mg Intravenous Q6H PRN Fatuma Guest, CRNP    oxyCODONE 10 mg Oral Q4H PRN Fatuma Guest, CRNP    oxyCODONE 5 mg Oral Q4H PRN Fatuma Guest, CRNP    sodium chloride 125 mL/hr Intravenous Continuous Glenis Britt MD Last Rate: 125 mL/hr (06/26/19 2021)   tamsulosin 0 4 mg Oral Daily With [employer], CRNP         Today, Patient Was Seen By: Sarah Pérez MD    ** Please Note: Dictation voice to text software may have been used in the creation of this document   **

## 2019-06-27 NOTE — SOCIAL WORK
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  He uses no DME's  He has used OP/PT in the past following a fracture, but does not remember the name of the agency  He has never used [facility] services  Denies substance abuse or mental health issues  He is a smoker PPD-1/2 x 4 years  Prior to that he chewed tobacco   He uses [facility] and has no problem with co-pays  He does not have a PCP  CM offered to make an appoint for him, but he refuses  CM supplied "Need a Family Doctor?" for his review  He has an Advanced Directive and POA, but it has to be updated because his ex wife Curtis Kaur is still listed as his POA  He works and drives  His friend Yonis Ardon or girlfriend Choco Saavedra will transport home when he is medically cleared  CM discussed d/c needs and none were identified  Pt plans on returning to work as soon as he is discharged  CM reviewed discharge planning process including the following: identifying help at home, patient preference for discharge planning needs, pharmacy preference, and availability of treatment team to discuss questions or concerns patient and/or family may have regarding understanding medications and recognizing signs and symptoms once discharged  CM also encouraged patient to follow up with all recommended appointments after discharge  Patient advised of importance for patient and family to participate in managing patients medical well being  CM name and role reviewed  Discharge Checklist reviewed and CM will continue to monitor for progress toward discharge goals in nursing and provider rounds

## 2019-06-27 NOTE — ASSESSMENT & PLAN NOTE
Likely related to kidney stone  Urology following  Pain control  IV fluid  Patient is scheduled for intervention today

## 2019-06-27 NOTE — PROGRESS NOTES
Called to bedside by RN as patient's family and the patient stated that did not fully understand what DNR DNI status pertain to, and after a bedside discussion the patient would prefer to have an attempt to be resuscitate and revived  At this time patient is requesting his status be changed to a full code  Family at bedside appreciate RN was at bedside during this discussion  Initial Anesthesia Post-op Note    Patient: Mikel Mcmillan  Procedure(s) Performed: UPPER GI ENDOSCOPY  Anesthesia type: Monitor Anesthesia Care    Vital Last Value   Temperature 36.7 °C (98.1 °F) (05/24/17 1320)   Pulse 61 (05/24/17 1320)   Respiratory Rate 18 (05/24/17 1320)   Non-Invasive   Blood Pressure 131/73 (05/24/17 1320)   Arterial  Blood Pressure     Pulse Oximetry 98 % (05/24/17 1320)     Last 24 I/O:   Intake/Output Summary (Last 24 hours) at 05/24/17 1421  Last data filed at 05/24/17 1419   Gross per 24 hour   Intake              500 ml   Output                0 ml   Net              500 ml       PATIENT LOCATION: PACU Phase 1  POST-OP VITAL SIGNS: stable  LEVEL OF CONSCIOUSNESS: sedated  RESPIRATORY STATUS: spontaneous ventilation  CARDIOVASCULAR: blood pressure returned to baseline and stable  PAIN MANAGEMENT: adequately controlled  NAUSEA: None  AIRWAY PATENCY: patent  POST-OP ASSESSMENT: no complications and patient tolerated procedure well with no complications  COMPLICATIONS: none  Comments: Report to RN, questions answered.  HANDOFF:  Handoff to receiving nurse was performed and questions were answered

## 2019-06-27 NOTE — UTILIZATION REVIEW
Initial Clinical Review    Admission: Date/Time/Statement: Observation 6/26/19 @1508    Orders Placed This Encounter   Procedures    Place in Observation     Standing Status:   Standing     Number of Occurrences:   1     Order Specific Question:   Admitting Physician     Answer:   Drea Choe [[id number]]     Order Specific Question:   Level of Care     Answer:   Med Surg [[id number]]     ED Arrival Information     Expected Arrival Acuity Means of Arrival Escorted By Service Admission Type    - 6/26/2019 10:44 Urgent Walk-In Self Hospitalist Urgent    Arrival Complaint    -        Chief Complaint   Patient presents with    Abdominal Pain     Pt reports RLQ abd pain  +N/V     Assessment/Plan: 46year old male to the ED from home with complaints of abdominal pain with nausea and vomiting  Admitted under observation for flank pain, nephrolithiasis  He had another visit to the ED earlier in the week, treated, and discharged, was feeling better, until current day, when he had sharp right flank pain  Found to have  Ureteral calculus with hydronephrosis  IV fluids, urology consult  Also demonstrating DANE  Baseline creat is 1 34, 2 01 on admission  Will recheck  NPO for potential intervention  Right sided abdominal tenderness, with CVA tenderness, urinary urgency and frequency  Urology Consult 6/27:  CT confirmed 4 mm distal ureteral calculus with resultant hydronephrosis  Patient remains afebrile, hemodynamically stable  He reports persistent flank pain, urinary urgency and frequency without dysuria, fever, chills or severe nausea/vomiting  If there is no spontaneous expulsion of the stone, plan for OR  Strain urine, aggressive IVFs, flomax        PROCEDURE NOTE:    Surgery date: 6/27/2019  Procedure: CYSTOSCOPY URETEROSCOPY WITH LITHOTRIPSY HOLMIUM LASER, RETROGRADE PYELOGRAM AND INSERTION STENT URETERAL WITH STONE BASKETTING (Right)  Operative Findings:  Intramural stone on the right, proximal hydronephrosis is noted, stone basketing form, right ureteral stent on a string was placed  ED Triage Vitals [06/26/19 1047]   Temperature Pulse Respirations Blood Pressure SpO2   98 9 °F (37 2 °C) 60 19 (!) 182/71 100 %      Temp Source Heart Rate Source Patient Position - Orthostatic VS BP Location FiO2 (%)   Oral Monitor Sitting Left arm --      Pain Score       Worst Possible Pain        Wt Readings from Last 1 Encounters:   06/26/19 75 7 kg (166 lb 14 2 oz)     Additional Vital Signs:   Date/Time  Temp  Pulse  Resp  BP  MAP (mmHg)  SpO2  O2 Device  Patient Position - Orthostatic VS   06/27/19 0748    70    121/78  93  97 %       06/27/19 0700  97 8 °F (36 6 °C)  63  18  121/78  93  96 %    Lying   06/27/19 0051  98 °F (36 7 °C)  77  18  123/76  92  97 %    Lying   06/26/19 1945  99 2 °F (37 3 °C)  68  16  129/82    98 %  None (Room air)  Lying   06/26/19 1353    69  18  127/64             Pertinent Labs/Diagnostic Test Results:   CT a/p 6/26:  Stable right-sided hydroureteronephrosis with slight migration of the distal ureteral calculus now at the ureterovesical junction  4 mm right middle lobe lung nodule, not imaged previously     Results from last 7 days   Lab Units 06/27/19  0457 06/26/19  1108 06/20/19  0941   WBC Thousand/uL 10 22* 14 57* 8 95   HEMOGLOBIN g/dL 14 8 17 0 15 7   HEMATOCRIT % 42 7 48 3 44 9   PLATELETS Thousands/uL 200 262 195   NEUTROS ABS Thousands/µL  --  11 25* 7 01     Results from last 7 days   Lab Units 06/27/19  0457 06/26/19  1108 06/20/19  0941   SODIUM mmol/L 137 133* 138   POTASSIUM mmol/L 3 7 4 6 3 9   CHLORIDE mmol/L 105 98* 103   CO2 mmol/L 23 21 24   ANION GAP mmol/L 9 14* 11   BUN mg/dL 18 26* 15   CREATININE mg/dL 1 56* 2 01* 1 34*   EGFR ml/min/1 73sq m 51 38 61   CALCIUM mg/dL 8 6 9 7 9 3     Results from last 7 days   Lab Units 06/26/19  1108 06/20/19  0941   AST U/L 26 23   ALT U/L 23 25   ALK PHOS U/L 72 63   TOTAL PROTEIN g/dL 8 0 7 5   ALBUMIN g/dL 4 1 3 9   TOTAL BILIRUBIN mg/dL 0 80 1 00   BILIRUBIN DIRECT mg/dL  --  0 21*     Results from last 7 days   Lab Units 06/27/19  0457 06/26/19  1108 06/20/19  0941   GLUCOSE RANDOM mg/dL 112 125 112     Results from last 7 days   Lab Units 06/20/19  0941   PROTIME seconds 14 1   INR  1 10   PTT seconds 29     Results from last 7 days   Lab Units 06/26/19  1108 06/20/19  0941   LIPASE u/L 137 86     Results from last 7 days   Lab Units 06/26/19  1318   CLARITY UA  Slightly Cloudy   COLOR UA  Yellow   SPEC GRAV UA  1 020   PH UA  6 5   GLUCOSE UA mg/dl Negative   KETONES UA mg/dl 40 (2+)*   BLOOD UA  Large*   PROTEIN UA mg/dl Trace*   NITRITE UA  Negative   BILIRUBIN UA  Negative   UROBILINOGEN UA E U /dl 0 2   LEUKOCYTES UA  Trace*   WBC UA /hpf 4-10*   RBC UA /hpf 10-20*   BACTERIA UA /hpf None Seen   EPITHELIAL CELLS WET PREP /hpf Occasional     ED Treatment:   Medication Administration from 06/26/2019 1044 to 06/26/2019 1948       Date/Time Order Dose Route Action     06/26/2019 1127 sodium chloride 0 9 % bolus 1,000 mL 1,000 mL Intravenous New Bag     06/26/2019 1109 ondansetron (ZOFRAN) injection 4 mg 4 mg Intravenous Given     06/26/2019 1109 HYDROmorphone (DILAUDID) injection 1 mg 1 mg Intravenous Given     06/26/2019 1221 HYDROmorphone (DILAUDID) injection 1 mg 1 mg Intravenous Given     06/26/2019 1221 ondansetron (ZOFRAN) injection 4 mg 4 mg Intravenous Given     06/26/2019 1403 HYDROmorphone (DILAUDID) injection 1 mg 1 mg Intravenous Given     06/26/2019 1440 nicotine (NICODERM CQ) 21 mg/24 hr TD 24 hr patch 21 mg 21 mg Transdermal Medication Applied     06/26/2019 1432 sodium chloride 0 9 % bolus 1,000 mL 1,000 mL Intravenous New Bag     06/26/2019 1543 sodium chloride 0 9 % infusion 100 mL/hr Intravenous New Bag     06/26/2019 1636 tamsulosin (FLOMAX) capsule 0 4 mg 0 4 mg Oral Given     06/26/2019 1809 docusate sodium (COLACE) capsule 100 mg 100 mg Oral Given     06/26/2019 1539 nicotine (NICODERM CQ) 21 mg/24 hr TD 24 hr patch 1 patch 1 patch Transdermal Not Given     06/26/2019 1809 morphine injection 2 mg 2 mg Intravenous Given     06/26/2019 1636 atorvastatin (LIPITOR) tablet 40 mg 40 mg Oral Given     06/26/2019 1631 ceftriaxone (ROCEPHIN) 1 g/50 mL in dextrose IVPB 1,000 mg Intravenous New Bag        Past Medical History:   Diagnosis Date    HIV (human immunodeficiency virus infection) ([facility] )     Hyperlipidemia      Admitting Diagnosis: Hydronephrosis [N13 30]  Ureterolithiasis [N20 1]  Abdominal pain [R10 9]  DANE (acute kidney injury) ([facility] ) [N17 9]  Age/Sex: 46 y o  male  Admission Orders:  Strain urine  Ambulate patient  Bladder scan  Urine culture  NPO  Current Facility-Administered Medications:  acetaminophen 650 mg Oral Q6H PRN   atorvastatin 40 mg Oral Daily With Dinner   bictegravir-emtricitab-tenofovir alafenamide 1 tablet Oral Daily With Breakfast   calcium carbonate 1,000 mg Oral Daily PRN   cefTRIAXone 1,000 mg Intravenous Q24H   docusate sodium 100 mg Oral BID   morphine injection 2 mg Intravenous Q1H PRN   nicotine 21 mg Transdermal Once   ondansetron 4 mg Intravenous Q6H PRN   oxyCODONE 10 mg Oral Q4H PRN   oxyCODONE 5 mg Oral Q4H PRN   sodium chloride 125 mL/hr Intravenous Continuous   tamsulosin 0 4 mg Oral Daily With Dinner   Dilaudid IV x3  Oxycodone pox3  Morphine ivx 1  IP CONSULT TO UROLOGY    Network Utilization Review Department  Phone: [phone]; Fax [phone]  [email or web address]  ATTENTION: Please call with any questions or concerns to [phone]  and carefully listen to the prompts so that you are directed to the right person  Send all requests for admission clinical reviews, approved or denied determinations and any other requests to fax [phone]   All voicemails are confidential

## 2019-06-27 NOTE — ASSESSMENT & PLAN NOTE
Stable right-sided hydroureteronephrosis with slight migration of the distal ureteral calculus now at the ureterovesical junction  IV fluid hydration  Pain control  Flomax  Discussed with Urology recommendations appreciated    Patient scheduled for OR today

## 2019-06-27 NOTE — ASSESSMENT & PLAN NOTE
Patient likely has CKD, Previous creatinine rate 1 34  2 01 on admission, improved to 1 5 today    Continue IV fluid hydration  Recheck creatinine tomorrow

## 2019-06-27 NOTE — PLAN OF CARE
Problem: GENITOURINARY - ADULT  Goal: Maintains or returns to baseline urinary function  Description  INTERVENTIONS:  - Assess urinary function  - Encourage oral fluids to ensure adequate hydration  - Administer IV fluids as ordered to ensure adequate hydration  - Administer ordered medications as needed  - Offer frequent toileting  - Follow urinary retention protocol if ordered  Outcome: Progressing  Goal: Absence of urinary retention  Description  INTERVENTIONS:  - Assess patients ability to void and empty bladder  - Monitor I/O  - Bladder scan as needed  - Discuss with physician/AP medications to alleviate retention as needed  - Discuss catheterization for long term situations as appropriate  Outcome: Progressing     Problem: PAIN - ADULT  Goal: Verbalizes/displays adequate comfort level or baseline comfort level  Description  Interventions:  - Encourage patient to monitor pain and request assistance  - Assess pain using appropriate pain scale  - Administer analgesics based on type and severity of pain and evaluate response  - Implement non-pharmacological measures as appropriate and evaluate response  - Consider cultural and social influences on pain and pain management  - Notify physician/advanced practitioner if interventions unsuccessful or patient reports new pain  Outcome: Progressing     Problem: SAFETY ADULT  Goal: Patient will remain free of falls  Description  INTERVENTIONS:  - Assess patient frequently for physical needs  -  Identify cognitive and physical deficits and behaviors that affect risk of falls    -  [place] fall precautions as indicated by assessment   - Educate patient/family on patient safety including physical limitations  - Instruct patient to call for assistance with activity based on assessment  - Modify environment to reduce risk of injury  - Consider OT/PT consult to assist with strengthening/mobility  Outcome: Progressing  Goal: Maintain or return to baseline ADL function  Description  INTERVENTIONS:  -  Assess patient's ability to carry out ADLs; assess patient's baseline for ADL function and identify physical deficits which impact ability to perform ADLs (bathing, care of mouth/teeth, toileting, grooming, dressing, etc )  - Assess/evaluate cause of self-care deficits   - Assess range of motion  - Assess patient's mobility; develop plan if impaired  - Assess patient's need for assistive devices and provide as appropriate  - Encourage maximum independence but intervene and supervise when necessary  ¯ Involve family in performance of ADLs  ¯ Assess for home care needs following discharge   ¯ Request OT consult to assist with ADL evaluation and planning for discharge  ¯ Provide patient education as appropriate  Outcome: Progressing  Goal: Maintain or return mobility status to optimal level  Description  INTERVENTIONS:  - Assess patient's baseline mobility status (ambulation, transfers, stairs, etc )    - Identify cognitive and physical deficits and behaviors that affect mobility  - Identify mobility aids required to assist with transfers and/or ambulation (gait belt, sit-to-stand, lift, walker, cane, etc )  - [place] fall precautions as indicated by assessment  - Record patient progress and toleration of activity level on Mobility SBAR; progress patient to next Phase/Stage  - Instruct patient to call for assistance with activity based on assessment  - Request Rehabilitation consult to assist with strengthening/weightbearing, etc   Outcome: Progressing

## 2019-06-28 VITALS
OXYGEN SATURATION: 96 % | RESPIRATION RATE: 18 BRPM | HEIGHT: 70 IN | HEART RATE: 57 BPM | BODY MASS INDEX: 23.89 KG/M2 | TEMPERATURE: 98 F | WEIGHT: 166.89 LBS | SYSTOLIC BLOOD PRESSURE: 146 MMHG | DIASTOLIC BLOOD PRESSURE: 85 MMHG

## 2019-06-28 LAB
ALBUMIN SERPL BCP-MCNC: 3.1 G/DL (ref 3.5–5)
ALP SERPL-CCNC: 54 U/L (ref 46–116)
ALT SERPL W P-5'-P-CCNC: 18 U/L (ref 12–78)
ANION GAP SERPL CALCULATED.3IONS-SCNC: 8 MMOL/L (ref 4–13)
AST SERPL W P-5'-P-CCNC: 10 U/L (ref 5–45)
BACTERIA UR CULT: NORMAL
BILIRUB SERPL-MCNC: 0.5 MG/DL (ref 0.2–1)
BUN SERPL-MCNC: 16 MG/DL (ref 5–25)
CALCIUM SERPL-MCNC: 8.3 MG/DL (ref 8.3–10.1)
CHLORIDE SERPL-SCNC: 104 MMOL/L (ref 100–108)
CO2 SERPL-SCNC: 26 MMOL/L (ref 21–32)
CREAT SERPL-MCNC: 1.5 MG/DL (ref 0.6–1.3)
ERYTHROCYTE [DISTWIDTH] IN BLOOD BY AUTOMATED COUNT: 12.6 % (ref 11.6–15.1)
GFR SERPL CREATININE-BSD FRML MDRD: 54 ML/MIN/1.73SQ M
GLUCOSE P FAST SERPL-MCNC: 109 MG/DL (ref 65–99)
GLUCOSE SERPL-MCNC: 109 MG/DL (ref 65–140)
HCT VFR BLD AUTO: 39.3 % (ref 36.5–49.3)
HGB BLD-MCNC: 13.6 G/DL (ref 12–17)
MCH RBC QN AUTO: 34.8 PG (ref 26.8–34.3)
MCHC RBC AUTO-ENTMCNC: 34.6 G/DL (ref 31.4–37.4)
MCV RBC AUTO: 101 FL (ref 82–98)
PLATELET # BLD AUTO: 194 THOUSANDS/UL (ref 149–390)
PMV BLD AUTO: 9.8 FL (ref 8.9–12.7)
POTASSIUM SERPL-SCNC: 3.8 MMOL/L (ref 3.5–5.3)
PROT SERPL-MCNC: 6.3 G/DL (ref 6.4–8.2)
RBC # BLD AUTO: 3.91 MILLION/UL (ref 3.88–5.62)
SODIUM SERPL-SCNC: 138 MMOL/L (ref 136–145)
WBC # BLD AUTO: 10.23 THOUSAND/UL (ref 4.31–10.16)

## 2019-06-28 PROCEDURE — 85027 COMPLETE CBC AUTOMATED: CPT | Performed by: INTERNAL MEDICINE

## 2019-06-28 PROCEDURE — 99225 PR SBSQ OBSERVATION CARE/DAY 25 MINUTES: CPT | Performed by: NURSE PRACTITIONER

## 2019-06-28 PROCEDURE — 80053 COMPREHEN METABOLIC PANEL: CPT | Performed by: INTERNAL MEDICINE

## 2019-06-28 PROCEDURE — 99217 PR OBSERVATION CARE DISCHARGE MANAGEMENT: CPT | Performed by: INTERNAL MEDICINE

## 2019-06-28 RX ORDER — OXYBUTYNIN CHLORIDE 5 MG/1
5 TABLET ORAL 3 TIMES DAILY
Status: DISCONTINUED | OUTPATIENT
Start: 2019-06-28 | End: 2019-06-28 | Stop reason: HOSPADM

## 2019-06-28 RX ADMIN — BICTEGRAVIR SODIUM, EMTRICITABINE, AND TENOFOVIR ALAFENAMIDE FUMARATE 1 TABLET: 50; 200; 25 TABLET ORAL at 08:54

## 2019-06-28 RX ADMIN — DOCUSATE SODIUM 100 MG: 100 CAPSULE, LIQUID FILLED ORAL at 08:54

## 2019-06-28 RX ADMIN — SODIUM CHLORIDE 100 ML/HR: 0.9 INJECTION, SOLUTION INTRAVENOUS at 05:14

## 2019-06-28 NOTE — PROGRESS NOTES
Progress Note - Leidy Dire 46 y o  male MRN: [id number]    Unit/Bed#: -01 Encounter: [id number]      Assessment:Kirt Ortiz is a pleasant 55-year-old male admitted for right flank pain secondary to obstructing ureteral calculus  Postoperative day 1 cystoscopy, ureteroscopy, laser lithotripsy, stone backs kit extraction and right ureteral stent placement  Patient is afebrile, hemodynamically stable and endorsing only mild complaints of stent colic; to be expected  Creatinine trending down to baseline at 1 5 this morning from greater than 2 yesterday  Plan:   stable for discharge  Maintain stent on string until Monday  Office will contact patient with time this afternoon  No further  intervention indicated during this hospital stay  Will sign off  Subjective:   Denies fever, chills, flank, abdominal, suprapubic, testicular or groin pain  Endorses mild urinary urgency and frequency; to be expected  Objective:     Vitals: Blood pressure 146/85, pulse 57, temperature 98 °F (36 7 °C), temperature source Oral, resp  rate 18, height 5' 10" (1 778 m), weight 75 7 kg (166 lb 14 2 oz), SpO2 96 %  ,Body mass index is 23 95 kg/m²        Intake/Output Summary (Last 24 hours) at 6/28/2019 0953  Last data filed at 6/28/2019 5022  Gross per 24 hour   Intake 3446 67 ml   Output 2575 ml   Net 871 67 ml       Physical Exam: General appearance: alert and oriented, in no acute distress, appears stated age, cooperative and no distress  Head: Normocephalic, without obvious abnormality, atraumatic  Neck: no adenopathy, no carotid bruit, no JVD, supple, symmetrical, trachea midline and thyroid not enlarged, symmetric, no tenderness/mass/nodules  Lungs: diminished breath sounds  Heart: regular rate and rhythm, S1, S2 normal, no murmur, click, rub or gallop  Abdomen: soft, non-tender; bowel sounds normal; no masses,  no organomegaly  Extremities: extremities normal, warm and well-perfused; no cyanosis, clubbing, or edema  Pulses: 2+ and symmetric  Neurologic: Grossly normal  Stent on string  Invasive Devices     Peripheral Intravenous Line            Peripheral IV 06/26/19 Left Antecubital 1 day          Drain            Ureteral Drain/Stent Right ureter 6 Fr  less than 1 day              Lab Results   Component Value Date    WBC 10 23 (H) 06/28/2019    HGB 13 6 06/28/2019    HCT 39 3 06/28/2019     (H) 06/28/2019     06/28/2019     Lab Results   Component Value Date    CALCIUM 8 3 06/28/2019    SODIUM 138 06/28/2019    K 3 8 06/28/2019    CO2 26 06/28/2019     06/28/2019    BUN 16 06/28/2019    CREATININE 1 50 (H) 06/28/2019         Lab, Imaging and other studies: I have personally reviewed pertinent reports

## 2019-06-28 NOTE — UTILIZATION REVIEW
Continued Stay Review    Date: 6/28/19                 Current Patient Class: observation  Current Level of Care: Med/surg    HPI:48 y o  male initially admitted under observation on 6/26 for hydronephrosis  Assessment/Plan: Patient is afebrile, hemodynamically stable and endorsing only mild complaints of stent colic; to be expected  Creatinine trending down to baseline at 1 5 this morning from greater than 2 yesterday      Pertinent Labs/Diagnostic Results:   Results from last 7 days   Lab Units 06/28/19 0421 06/27/19 0457 06/26/19  1108   WBC Thousand/uL 10 23* 10 22* 14 57*   HEMOGLOBIN g/dL 13 6 14 8 17 0   HEMATOCRIT % 39 3 42 7 48 3   PLATELETS Thousands/uL 194 200 262   NEUTROS ABS Thousands/µL  --   --  11 25*         Results from last 7 days   Lab Units 06/28/19 0421 06/27/19 0457 06/26/19  1108   SODIUM mmol/L 138 137 133*   POTASSIUM mmol/L 3 8 3 7 4 6   CHLORIDE mmol/L 104 105 98*   CO2 mmol/L 26 23 21   ANION GAP mmol/L 8 9 14*   BUN mg/dL 16 18 26*   CREATININE mg/dL 1 50* 1 56* 2 01*   EGFR ml/min/1 73sq m 54 51 38   CALCIUM mg/dL 8 3 8 6 9 7     Results from last 7 days   Lab Units 06/28/19  0421 06/26/19  1108   AST U/L 10 26   ALT U/L 18 23   ALK PHOS U/L 54 72   TOTAL PROTEIN g/dL 6 3* 8 0   ALBUMIN g/dL 3 1* 4 1   TOTAL BILIRUBIN mg/dL 0 50 0 80         Results from last 7 days   Lab Units 06/28/19 0421 06/27/19 0457 06/26/19  1108   GLUCOSE RANDOM mg/dL 109 112 125     Results from last 7 days   Lab Units 06/26/19  1108   LIPASE u/L 137     Results from last 7 days   Lab Units 06/26/19  1318   CLARITY UA  Slightly Cloudy   COLOR UA  Yellow   SPEC GRAV UA  1 020   PH UA  6 5   GLUCOSE UA mg/dl Negative   KETONES UA mg/dl 40 (2+)*   BLOOD UA  Large*   PROTEIN UA mg/dl Trace*   NITRITE UA  Negative   BILIRUBIN UA  Negative   UROBILINOGEN UA E U /dl 0 2   LEUKOCYTES UA  Trace*   WBC UA /hpf 4-10*   RBC UA /hpf 10-20*   BACTERIA UA /hpf None Seen   EPITHELIAL CELLS WET PREP /hpf Occasional Vital Signs:   Date/Time  Temp  Pulse  Resp  BP  MAP (mmHg)  SpO2  O2 Device  Patient Position - Orthostatic VS   06/28/19 0723  98 °F (36 7 °C)  57  18  146/85    96 %  None (Room air)  Lying   06/27/19 1830  98 2 °F (36 8 °C)  83  18  126/61  85  96 %  None (Room air)  Lying   06/27/19 1730  98 2 °F (36 8 °C)  83  18  126/61  85  96 %  None (Room air)  Lying   06/27/19 1630  98 3 °F (36 8 °C)  61  18  124/77  96  98 %  None (Room air)  Sitting   06/27/19 1530  98 3 °F (36 8 °C)  66  18  124/77             Medications:   Scheduled Meds:   Current Facility-Administered Medications:  acetaminophen 650 mg Oral Q6H PRN   atorvastatin 40 mg Oral Daily With Dinner   bictegravir-emtricitab-tenofovir alafenamide 1 tablet Oral Daily With Breakfast   calcium carbonate 1,000 mg Oral Daily PRN   cefTRIAXone 1,000 mg Intravenous Q24H   docusate sodium 100 mg Oral BID   morphine injection 2 mg Intravenous Q1H PRN   nicotine 21 mg Transdermal Once   ondansetron 4 mg Intravenous Q6H PRN   oxybutynin 5 mg Oral TID   oxyCODONE 10 mg Oral Q4H PRN   oxyCODONE 5 mg Oral Q4H PRN   sodium chloride 100 mL/hr Intravenous Continuous   tamsulosin 0 4 mg Oral Daily With Dinner       Discharge Plan: 6/28  Network Utilization Review Department  Phone: [phone]; Fax [phone]  [email or web address]  ATTENTION: Please call with any questions or concerns to [phone]  and carefully listen to the prompts so that you are directed to the right person  Send all requests for admission clinical reviews, approved or denied determinations and any other requests to fax [phone]   All voicemails are confidential

## 2019-06-28 NOTE — DISCHARGE SUMMARY
Discharge Summary - [address] [facility]    Patient Information: Hillary Ponce 46 y o  male MRN: [id number]  Unit/Bed#: -01 Encounter: [id number]    Discharging Physician / Practitioner: Ananya Bates MD  PCP: No primary care provider on file  Admission Date: 6/26/2019  Discharge Date: 06/28/19    Disposition:     Home    Reason for Admission:  Urolithiasis    Discharge Diagnoses:     Principal Problem:    Ureterolithiasis  Active Problems:    Nephrolithiasis    Flank pain    HIV (human immunodeficiency virus infection) ([place] [address] )    DANE (acute kidney injury) ([place] [address] )    Hyperlipidemia    Pulmonary nodule  Resolved Problems:    * No resolved hospital problems  *      Consultations During Hospital Stay:  · Urology    Procedures Performed:     · Cystoscopy, ureteroscopy, laser lithotripsy extraction of stone and right ureteral stent placement    Significant Findings / Test Results:     · CT of the abdomen  1  Stable right-sided hydroureteronephrosis with slight migration of the distal ureteral calculus now at the ureterovesical junction      2   4 mm right middle lobe lung nodule, not imaged previously  Based on current Fleischner Society 2017 Guidelines on incidental pulmonary nodule, no routine follow-up is needed if the patient is considered low risk for lung cancer  If the patient is   considered high risk for lung cancer, 12 month follow-up non-contrast chest CT is recommended  Incidental Findings:   · As above  Discussed with patient, PCP follow-up     Test Results Pending at Discharge (will require follow up): · None     Outpatient Tests Requested:  · None    Complications:  None    Hospital Course:     Hillary Ponce is a 46 y o  male patient with past medical history of kidney stones, HIV who originally presented to the hospital on 6/26/2019 due to right flank pain, nausea abdominal pain  CT of the abdomen demonstrated obstructing right ureteral calculus    He was seen by Urology, initially provided supportive care including aggressive IV fluid hydration and pain control  Later underwent cystoscopy, ureteroscopy and laser lithotripsy with extraction of stone and ureteral stent placement  Clinically and hemodynamically improved  His pain resolved  His acute kidney injury on admission improved  Patient was discharged home, recommended close outpatient Urology follow-up after discharge  All other chronic medical problems have been stable  Recommended close outpatient follow-up with PCP  Condition at Discharge: stable     Discharge Day Visit / Exam:     Subjective:  Patient seen and examined  No complaints at this time  Feels better  Anxious for discharge  Vitals: Blood Pressure: 146/85 (06/28/19 0723)  Pulse: 57 (06/28/19 0723)  Temperature: 98 °F (36 7 °C) (06/28/19 0723)  Temp Source: Oral (06/28/19 0723)  Respirations: 18 (06/28/19 0723)  Height: 5' 10" (177 8 cm) (06/27/19 1500)  Weight - Scale: 75 7 kg (166 lb 14 2 oz) (06/27/19 1500)  SpO2: 96 % (06/28/19 0723)  Exam:   Physical Exam   Constitutional: He appears well-developed and well-nourished  No distress  Eyes: Pupils are equal, round, and reactive to light  EOM are normal    Neck: Normal range of motion  Neck supple  Cardiovascular: Normal rate and regular rhythm  Pulmonary/Chest: Effort normal and breath sounds normal    Abdominal: Soft  Bowel sounds are normal  There is no tenderness  Musculoskeletal: Normal range of motion  Neurological: He is alert  Skin: Skin is warm  He is not diaphoretic  Discussion with Family:  Patient    Discharge instructions/Information to patient and family:   See after visit summary for information provided to patient and family  Provisions for Follow-Up Care:  See after visit summary for information related to follow-up care and any pertinent home health orders  Planned Readmission:  None     Discharge Statement:  I spent 35minutes discharging the patient   This time was spent on the day of discharge  I had direct contact with the patient on the day of discharge  Greater than 50% of the total time was spent examining patient, answering all patient questions, arranging and discussing plan of care with patient as well as directly providing post-discharge instructions  Additional time then spent on discharge activities  Discharge Medications:  See after visit summary for reconciled discharge medications provided to patient and family        ** Please Note: This note has been constructed using a voice recognition system **

## 2019-07-01 ENCOUNTER — TELEPHONE (OUTPATIENT)
Dept: UROLOGY | Facility: CLINIC | Age: 49
End: 2019-07-01

## 2019-07-01 DIAGNOSIS — N20.0 NEPHROLITHIASIS: Primary | ICD-10-CM

## 2019-07-01 NOTE — TELEPHONE ENCOUNTER
Patient no showed to nurse visit this morning at 10:00 for stent removal  Called and left message for patient to call the office back

## 2019-07-01 NOTE — TELEPHONE ENCOUNTER
Attempted to call patient again to see if he needs to reschedule or if his stent came out  Patient did not answer  Left message for patient to call the office back  Office number was left in the message

## 2019-07-01 NOTE — UTILIZATION REVIEW
Notification of Discharge  This is a Notification of Discharge from our facility [address]  Please be advised that this patient has been discharge from our facility  Below you will find the admission and discharge date and time including the patients disposition  PRESENTATION DATE: 6/26/2019 10:50 AM  OBS ADMISSION DATE: [unfilled]  IP ADMISSION DATE: [unfilled] [unfilled]  DISCHARGE DATE: 6/28/2019 10:58 AM  DISPOSITION: Home/Self Care  Admission Orders (From admission, onward)    Ordered        06/26/19 1508  Place in Observation  Once               Please contact the UR Department if additional information is required to close this patient's authorization/case  [address]  Utilization Review Department  Phone: [phone]; Fax [phone]  [email or web address]  ATTENTION: Please call with any questions or concerns to [phone]  and carefully listen to the prompts so that you are directed to the right person  Send all requests for admission clinical reviews, approved or denied determinations and any other requests to fax [phone]   All voicemails are confidential

## 2019-07-03 LAB
CA PHOS MFR STONE: 35 %
CALCIUM OXALATE DIHYDRATE MFR STONE IR: 25 %
COLOR STONE: NORMAL
COM MFR STONE: 40 %
COMMENT-STONE3: NORMAL
COMPOSITION: NORMAL
LABORATORY COMMENT REPORT: NORMAL
NIDUS STONE QL: NORMAL
PHOTO: NORMAL
SIZE STONE: NORMAL MM
STONE ANALYSIS-IMP: NORMAL
WT STONE: 31.1 MG

## 2019-07-03 NOTE — TELEPHONE ENCOUNTER
Yes, thank you for having this letter sent  The clinical issue of the retained stent is a difficult one   We have made multiple, good melissa attempts to reach this patient, I am hopeful that the certified letter will bring him back to the office for the appropriate stent removal

## 2019-07-03 NOTE — TELEPHONE ENCOUNTER
Attempted to call patient again for the fourth time to see if he needs to reschedule or if his stent came out  Patient did not answer  Left message for patient to call the office back  Office number was left in the message    Please send certified letter to patient as he does have a stent in and we are unable send to contact him via phone

## 2019-07-31 ENCOUNTER — TELEPHONE (OUTPATIENT)
Dept: UROLOGY | Facility: AMBULATORY SURGERY CENTER | Age: 49
End: 2019-07-31

## 2019-07-31 NOTE — TELEPHONE ENCOUNTER
Certified letter was returned as "unclaimed, unable to forward"  I was able to speak to  the patient via phone today  He stated he removed the stent himself and has had no difficulties  He did not follow up with any provider nor does he plan on coming for a follow up  He is feeling well and was content with the services provided  I expressed he should schedule a time to come in for a recheck

## 2021-04-12 ENCOUNTER — IMMUNIZATIONS (OUTPATIENT)
Dept: FAMILY MEDICINE CLINIC | Facility: HOSPITAL | Age: 51
End: 2021-04-12

## 2021-04-12 DIAGNOSIS — Z23 ENCOUNTER FOR IMMUNIZATION: Primary | ICD-10-CM

## 2021-04-12 PROCEDURE — 91300 SARS-COV-2 / COVID-19 MRNA VACCINE (PFIZER-BIONTECH) 30 MCG: CPT

## 2021-04-12 PROCEDURE — 0001A SARS-COV-2 / COVID-19 MRNA VACCINE (PFIZER-BIONTECH) 30 MCG: CPT

## 2021-05-03 ENCOUNTER — IMMUNIZATIONS (OUTPATIENT)
Dept: FAMILY MEDICINE CLINIC | Facility: HOSPITAL | Age: 51
End: 2021-05-03

## 2021-05-03 DIAGNOSIS — Z23 ENCOUNTER FOR IMMUNIZATION: Primary | ICD-10-CM

## 2021-05-03 PROCEDURE — 0002A SARS-COV-2 / COVID-19 MRNA VACCINE (PFIZER-BIONTECH) 30 MCG: CPT

## 2021-05-03 PROCEDURE — 91300 SARS-COV-2 / COVID-19 MRNA VACCINE (PFIZER-BIONTECH) 30 MCG: CPT

## 2021-07-26 ENCOUNTER — TELEPHONE (OUTPATIENT)
Dept: UROLOGY | Facility: AMBULATORY SURGERY CENTER | Age: 51
End: 2021-07-26

## 2021-07-26 NOTE — TELEPHONE ENCOUNTER
Patient managed by Dr Reg Norman) patient called in stating he seen his a NP Dr Rita Hathaway at [place] ph: [phone] and by his PSA test his numbers are high   Patient stated they have the results of the test   Patient can be reached at [phone]

## 2021-07-26 NOTE — TELEPHONE ENCOUNTER
PSA levels not in epic, called patient made aware to have his PCP fax over requested info and we can schedule and appropriate visit  Fax number provided  Patient verbalized understanding

## 2021-07-29 ENCOUNTER — OFFICE VISIT (OUTPATIENT)
Dept: UROLOGY | Facility: CLINIC | Age: 51
End: 2021-07-29

## 2021-07-29 VITALS
WEIGHT: 163 LBS | BODY MASS INDEX: 24.14 KG/M2 | HEIGHT: 69 IN | DIASTOLIC BLOOD PRESSURE: 78 MMHG | HEART RATE: 86 BPM | SYSTOLIC BLOOD PRESSURE: 128 MMHG

## 2021-07-29 DIAGNOSIS — Z12.5 SCREENING FOR PROSTATE CANCER: Primary | ICD-10-CM

## 2021-07-29 PROCEDURE — 99213 OFFICE O/P EST LOW 20 MIN: CPT | Performed by: PHYSICIAN ASSISTANT

## 2021-07-29 RX ORDER — FLUOCINOLONE ACETONIDE 0.11 MG/ML
OIL TOPICAL
COMMUNITY
Start: 2021-06-11

## 2021-07-29 RX ORDER — SILDENAFIL 50 MG/1
TABLET, FILM COATED ORAL
COMMUNITY
Start: 2021-06-02

## 2021-07-29 RX ORDER — FOLIC ACID 1 MG/1
1000 TABLET ORAL DAILY
COMMUNITY
Start: 2021-07-09

## 2021-07-29 NOTE — PROGRESS NOTES
7/29/2021      Chief Complaint   Patient presents with    Routine screening prostate cancer         Assessment and Plan  1  Routine screening prostate cancer   - Most recent PSA 1 2  - SHUKRI today showing asymmetric (L>R) and firm  - Will repeat PSA in 4-6 weeks to reassess   - All questions answered; patient understands and agrees with plan        History of Present Illness  Lavelle Núñez is a 44 y o  male patient here for evaluation of PSA  Most recent PSA 1 2  Patient does not have previous PSA for comparison  States his father had "some type of cancer" at a young age  He is unsure if this was prostate cancer or testicular cancer  Denies urinary symptoms  Denies frequency, urgency, dysuria, gross hematuria, flank pain, suprapubic pain, fevers, chills  Review of Systems   Constitutional: Negative for activity change, appetite change, chills and fever  HENT: Negative for congestion and trouble swallowing  Respiratory: Negative for cough and shortness of breath  Cardiovascular: Negative for chest pain, palpitations and leg swelling  Gastrointestinal: Negative for abdominal pain, constipation, diarrhea, nausea and vomiting  Genitourinary: Negative for difficulty urinating, dysuria, flank pain, frequency, hematuria and urgency  Musculoskeletal: Negative for back pain and gait problem  Skin: Negative for wound  Allergic/Immunologic: Negative for immunocompromised state  Neurological: Negative for dizziness and syncope  Hematological: Does not bruise/bleed easily  Psychiatric/Behavioral: Negative for confusion  All other systems reviewed and are negative  Vitals  Vitals:    07/29/21 1248   BP: 128/78   Pulse: 86   Weight: 73 9 kg (163 lb)   Height: 5' 9" (1 753 m)       Physical Exam  Constitutional:       Appearance: Normal appearance  HENT:      Head: Normocephalic     Pulmonary:      Effort: Pulmonary effort is normal    Genitourinary:     Comments: SHUKRI showing firm prostate  Asymmetric L>R  Musculoskeletal:      Cervical back: Normal range of motion  Skin:     General: Skin is warm and dry  Neurological:      General: No focal deficit present  Mental Status: He is alert and oriented to person, place, and time  Psychiatric:         Mood and Affect: Mood normal          Behavior: Behavior normal          Thought Content: Thought content normal          Judgment: Judgment normal            Past History  Past Medical History:   Diagnosis Date    HIV (human immunodeficiency virus infection) ([address] )     Hyperlipidemia      Social History     Socioeconomic History    Marital status:      Spouse name: None    Number of children: None    Years of education: None    Highest education level: None   Occupational History    None   Tobacco Use    Smoking status: Current Every Day Smoker     Packs/day: 0 50     Types: Cigarettes    Smokeless tobacco: Current User     Types: Chew   Substance and Sexual Activity    Alcohol use: Yes     Alcohol/week: 12 0 standard drinks     Types: 12 Cans of beer per week    Drug use: Not Currently     Frequency: 4 0 times per week     Types: Marijuana     Comment: last use 6/25/19    Sexual activity: None   Other Topics Concern    None   Social History Narrative    None     Social Determinants of Health     Financial Resource Strain:     Difficulty of Paying Living Expenses:    Food Insecurity:     Worried About Running Out of Food in the Last Year:     Ran Out of Food in the Last Year:    Transportation Needs:     Lack of Transportation (Medical):      Lack of Transportation (Non-Medical):    Physical Activity:     Days of Exercise per Week:     Minutes of Exercise per Session:    Stress:     Feeling of Stress :    Social Connections:     Frequency of Communication with Friends and Family:     Frequency of Social Gatherings with Friends and Family:     Attends Hoahaoism Services:     Active Member of Clubs or Organizations:     Attends Club or Organization Meetings:     Marital Status:    Intimate Partner Violence:     Fear of Current or Ex-Partner:     Emotionally Abused:     Physically Abused:     Sexually Abused:      Social History     Tobacco Use   Smoking Status Current Every Day Smoker    Packs/day: 0 50    Types: Cigarettes   Smokeless Tobacco Current User    Types: Chew     History reviewed  No pertinent family history  The following portions of the patient's history were reviewed and updated as appropriate: allergies, current medications, past medical history, past social history, past surgical history and problem list     Results  No results found for this or any previous visit (from the past 1 hour(s))  ]  No results found for: PSA  Lab Results   Component Value Date    CALCIUM 8 3 06/28/2019    K 3 8 06/28/2019    CO2 26 06/28/2019     06/28/2019    BUN 16 06/28/2019    CREATININE 1 50 (H) 06/28/2019     Lab Results   Component Value Date    WBC 10 23 (H) 06/28/2019    HGB 13 6 06/28/2019    HCT 39 3 06/28/2019     (H) 06/28/2019     06/28/2019       Ltanya Krabbe, PA-C

## 2021-09-03 ENCOUNTER — IMMUNIZATIONS (OUTPATIENT)
Dept: FAMILY MEDICINE CLINIC | Facility: HOSPITAL | Age: 51
End: 2021-09-03

## 2021-09-03 DIAGNOSIS — Z23 ENCOUNTER FOR IMMUNIZATION: Primary | ICD-10-CM

## 2021-09-03 PROCEDURE — 0001A SARS-COV-2 / COVID-19 MRNA VACCINE (PFIZER-BIONTECH) 30 MCG: CPT

## 2021-09-03 PROCEDURE — 91300 SARS-COV-2 / COVID-19 MRNA VACCINE (PFIZER-BIONTECH) 30 MCG: CPT

## 2021-11-29 ENCOUNTER — APPOINTMENT (EMERGENCY)
Dept: RADIOLOGY | Facility: HOSPITAL | Age: 51
End: 2021-11-29
Payer: COMMERCIAL

## 2021-11-29 ENCOUNTER — HOSPITAL ENCOUNTER (EMERGENCY)
Facility: HOSPITAL | Age: 51
Discharge: HOME | End: 2021-11-29
Attending: EMERGENCY MEDICINE | Admitting: EMERGENCY MEDICINE
Payer: COMMERCIAL

## 2021-11-29 VITALS
OXYGEN SATURATION: 98 % | DIASTOLIC BLOOD PRESSURE: 90 MMHG | HEART RATE: 89 BPM | SYSTOLIC BLOOD PRESSURE: 130 MMHG | RESPIRATION RATE: 18 BRPM | TEMPERATURE: 98.4 F

## 2021-11-29 DIAGNOSIS — J34.89 RHINORRHEA: ICD-10-CM

## 2021-11-29 DIAGNOSIS — R09.81 NASAL CONGESTION: ICD-10-CM

## 2021-11-29 DIAGNOSIS — R05.9 COUGH: Primary | ICD-10-CM

## 2021-11-29 LAB
FLUAV RNA RESP QL NAA+PROBE: NEGATIVE
FLUBV RNA RESP QL NAA+PROBE: NEGATIVE
RSV RNA RESP QL NAA+PROBE: NEGATIVE
SARS-COV-2 RNA RESP QL NAA+PROBE: NEGATIVE

## 2021-11-29 PROCEDURE — 0241U HB NFCT DS VIR RESP RNA 4 TRGT: CPT | Performed by: EMERGENCY MEDICINE

## 2021-11-29 PROCEDURE — 71045 X-RAY EXAM CHEST 1 VIEW: CPT

## 2021-11-29 PROCEDURE — 99283 EMERGENCY DEPT VISIT LOW MDM: CPT

## 2021-11-29 PROCEDURE — 99284 EMERGENCY DEPT VISIT MOD MDM: CPT | Performed by: EMERGENCY MEDICINE

## 2021-11-29 RX ORDER — GUAIFENESIN 400 MG/1
400 TABLET ORAL EVERY 6 HOURS PRN
Qty: 20 TABLET | Refills: 0 | Status: SHIPPED | OUTPATIENT
Start: 2021-11-29

## 2021-11-29 RX ORDER — BENZONATATE 100 MG/1
100 CAPSULE ORAL 3 TIMES DAILY PRN
Qty: 20 CAPSULE | Refills: 0 | Status: SHIPPED | OUTPATIENT
Start: 2021-11-29

## 2022-01-18 ENCOUNTER — TELEPHONE (OUTPATIENT)
Dept: UROLOGY | Facility: MEDICAL CENTER | Age: 52
End: 2022-01-18

## 2022-01-18 NOTE — TELEPHONE ENCOUNTER
Called office of Lanre Gutierrez  Office is currently closed  It looks like patient hasnt been seen by our office since last July for an elevated PSA  He was supposed to have a repeat PSA and fu visit 4 weeks later which never happened  Im unsure what plan of care PCP is referring to   Will try to call again in AM

## 2022-01-18 NOTE — TELEPHONE ENCOUNTER
Patient seen by Jessica Puckett in [facility] patients PCP called stating she would like a call back in regards to discuss plan of care for patient  It is important she hears from the office    Please call her at  [phone]

## 2022-01-19 NOTE — TELEPHONE ENCOUNTER
PSA was 0 9 not 22  Free PSA percentage was 22%  His PSA is within normal range  He can follow up to further discuss and for SHUKRI given his slightly abnormal exam at last office visit

## 2022-01-19 NOTE — TELEPHONE ENCOUNTER
Received records from [address] [place] [address] - recent PSA was 22 on 01/18/22  Will send OV note and bw to central scanning

## 2022-01-20 ENCOUNTER — PATIENT OUTREACH (OUTPATIENT)
Dept: SURGERY | Facility: OTHER | Age: 52
End: 2022-01-20

## 2022-01-20 NOTE — PROGRESS NOTES
called client to complete new intake   went over all intake forms and obtained client's verbal consent  No signatures were collected at this time due to COVID-19  Client will go to [facility] to fax all required documentation to cm today  Client is a 46year old white, non-, streight male who contracted HIV in 1998  Ct is unsure how he contracted HIV  Ct is adherent to HIV care and sees his ID doctor at [facility]  Ct recently got labs done and saw his doctor last week  Cm will request latest labs and HIV confirmatory from [facility]  Ct is overall in stable medical health  Ct has no oral health concerns, has most teeth pulled and partial dentures  No dental referral was made at this time  Ct states is abstaining from risky behaviors,has no current mental health issues,nor addictions  Ct mentioned past history of Bipolar disorder and alcohol dependency  Ct states that he stopped having Bipolar when he stopped drinking  Ct states he has been sober for many years,but states has a drink/beer casually every once in a while  Ct did go to rehab twice in the 1990s for alcohol dependency  Ct also reported injecting drugs back in the 1990s, however it was only 4-5 times for "fun"  Ct never shared needles with others  Ct is currently single however has sex once a month with his ex wife  Ct states he always uses protection when sexually active  Ct reports two hospitalizations, one for a dog bite, and one for kidney stones  Ct has criminal history and served time in [facility] for multiple [address]  Ct was on probation and parole,also charged with felonies but that was many years ago  Ct has active Medicaid coverage  Ct currently has no income  Ct stated he has been applying for unemployment but not receiving any money  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  Because ct has zero income,ct cannot afford to pay rent   Ct is homeless and resides at a shelter at the [facility] in [place]  Ct does not report domestic violence concerns  Ct states is looking actively for work,however is hard to get a job when he has no place to live in  Cm will discuss possible housing assistance with  Malgorzata Hargrove and supervisor  Cm did discuss possible future housing/TBRA assistance requirements with client  Ct has no car and walks everywhere  Ct's ID care is in walking distance from the shelter he is currently in  Ct stated his phone will most likely be shut off today since ct cannot afford to pay the bill  Ct is waiting for his [employer] phone to arrive  Once ct gets his phone he will reach out to cm  Cm also provided ct with e-mail and fax number  Ct has no language comprehension issues  Ct already has a living will and POA  Cm completed acuity scale  New intake is complete  See media  NOTE: Cm corrected first page of Rw Recert form  See media

## 2022-01-21 ENCOUNTER — PATIENT OUTREACH (OUTPATIENT)
Dept: SURGERY | Facility: OTHER | Age: 52
End: 2022-01-21

## 2022-01-21 NOTE — PROGRESS NOTES
Cm requested ct's latest labs and HIV confirmatory from [address] [place] [address]  See media  Cm sent new intake message to supervisor Daysi Arce  Supervisor opened client in [place]  Cm entered ct's info to [place]  Cm received ct's medical records and labs from [place] [address]  See media  Cm requested HIV Confirmatory from [facility] since cm did not receive one from [place] [address]

## 2022-01-26 ENCOUNTER — PATIENT OUTREACH (OUTPATIENT)
Dept: SURGERY | Facility: OTHER | Age: 52
End: 2022-01-26

## 2022-01-26 NOTE — PROGRESS NOTES
Ct had left voicemail stating his phone was officially shut off  Ct stated will contact cm when he receives his government phone  Ct stated cm can reach him via e-mail for now if needed

## 2022-02-03 ENCOUNTER — PATIENT OUTREACH (OUTPATIENT)
Dept: SURGERY | Facility: OTHER | Age: 52
End: 2022-02-03

## 2022-02-03 NOTE — PROGRESS NOTES
Miguel received call from ct's  Amparo at [facility]  Amparo stated ct will be receiving his government phone either today or tomorrow  Per Long Chen will reach out to cm once he receives his phone  Cm requested ct's HIV confirmatory from Junior/Amparo since the [facility] has not faxed one out yet  Amparo and miguel discussed client's current and past mental health history  Per Epic medical records and per Amparo, client has displayed substance abuse,alcohol  Vladimirevan Mcconnellas has been trying to refer client to a long term rehab facility however client refuses treatment  Ct also suffers from Bipolar disorder,with reports of hallucinations and has been experiencing depressive episodes  Ct denied having an alcohol problem or any mental health concerns at [address] records show otherwise  Ct has been admitted to the ER 3 times since November 2021, intoxicated and suicidal  Enrico Lewis is homeless and is looking for housing assistance  Ct has no job  Cm will discuss the above with [employer] to inquire how to proceed with housing assistance

## 2022-02-08 ENCOUNTER — PATIENT OUTREACH (OUTPATIENT)
Dept: SURGERY | Facility: OTHER | Age: 52
End: 2022-02-08

## 2022-02-08 NOTE — PROGRESS NOTES
Cm discussed hotel assistance with Aníbal Kelley from [place]  Per Aníbal Kelley, since ct is already in a shelter, cm should discuss how to proceed with supervisor Lynda Leach  Cm discussed client with supervisor Lynda Leach  Cm explained that ct is currently in a homeless shelter in [facility] but has requested hotel assistance/housing assistance from [employer]  Ct has no income and has severe mental health issues and alcohol dependency per ct's [facility] medical records  Per ct's  at [place] [address], ct refuses to be referred to a long term rehab facility  Per supervisor, since ct is already in a shelter there is no need for hotel assistance  Per supervisor, ct can inquire on TBRA assistance once ct is employed and in treatment  Per supervisor, cm can refer client to [facility] and [facility]  Cm discussed above with ct's cm Marcela

## 2022-02-14 ENCOUNTER — TELEPHONE (OUTPATIENT)
Dept: SURGERY | Facility: OTHER | Age: 52
End: 2022-02-14

## 2022-02-14 NOTE — TELEPHONE ENCOUNTER
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

## 2022-02-16 ENCOUNTER — PATIENT OUTREACH (OUTPATIENT)
Dept: SURGERY | Facility: OTHER | Age: 52
End: 2022-02-16

## 2022-02-16 NOTE — PROGRESS NOTES
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

## 2022-02-16 NOTE — PROGRESS NOTES
Miguel contacted [employer]  to inform them Miguel still did not receive the Ct's lab report  Cm stated she will drive to the [employer] office today to  the paper work  Junior LINDQUIST agreed

## 2022-02-21 ENCOUNTER — DOCUMENTATION (OUTPATIENT)
Dept: SURGERY | Facility: OTHER | Age: 52
End: 2022-02-21

## 2022-02-21 ENCOUNTER — PATIENT OUTREACH (OUTPATIENT)
Dept: SURGERY | Facility: OTHER | Age: 52
End: 2022-02-21

## 2022-02-21 NOTE — PROGRESS NOTES
Cm emailed Ct her contact information for once he obtains his government phone, to reach out to AMEENA

## 2022-03-11 ENCOUNTER — PATIENT OUTREACH (OUTPATIENT)
Dept: SURGERY | Facility: OTHER | Age: 52
End: 2022-03-11

## 2022-03-11 NOTE — PROGRESS NOTES
CM received a call from ct, stated this is his new phone number  Cm asked for an update on Ct  Ct informed Cm that he is no longer staying at the [facility] at night  Ct stated he is living in a tent [place] the woods by choice  Ct stated the [facility] weather shelter has everyone leave at 6 in the morning at can come back at [facility] [address] stated it is humiliating to leave the shelter at [place] with all the other people, for the whole town to see us  Ct stated he is working part time at [employer]  As a   Ct is making 10 dollars an hour  Cm suggested to Ct to apply at [employer]  in [place] as a  as well part time, to then obtain full time income  Ct states his mental health status okay under the circumstances of being homeless  Ct states he is drinking occasionally  Cm discussed housing with Ct  It was recommended he receive treatment for his drinking before any housing assistance take place  Also ct needs proof of stable income  Cm discussed this with Ct  Ct stated he understood  Cm suggested getting services through [address] [place] [address] to [facility] and AA  Ct refused but stated he will call CM if interested in future

## 2022-08-02 NOTE — ED PROVIDER NOTES
"Chief Complaint  Establish Care (No PCP since 16 years old )    Subjective  Patient is a 35-year-old male who is here today for his first visit.  He is accompanied by his wife.  Primary complaint is fatigue and feeling wade.  He has difficulty falling asleep and was drinking 5-6 Monster energy drinks per day to which she is currently decreased intake to no more than 1/day and that is consumed in the morning.  He works dayshift.  States he cannot turn his mind off at night to sleep.  Diagnosed with ADD or ADHD as a child and previously took Zoloft as a child but was not effective.  History of depression hospitalized at [facility] as a teen and history of cutting.  Feels as if things have been going really well until the last couple years when his stress level has increased.  Within the last couple of months there was an episode where he became so angry that he punched a wall.  This scared his wife and daughter and wife has encouraged he come today for treatment.  Patient and wife deny that he is ever physically violent toward himself or others.  He does currently smoke.  Denies any alcohol intake.        Javier Fuentes presents to [facility]          Objective   Vital Signs:   Vitals:    08/02/22 1531   BP: 126/87   BP Location: Left arm   Patient Position: Sitting   Cuff Size: Large Adult   Pulse: 86   SpO2: 99%   Weight: 108 kg (237 lb)   Height: 175.3 cm (69\")      Body mass index is 35 kg/m².  Physical Exam  Vitals reviewed.   Constitutional:       General: He is not in acute distress.     Appearance: Normal appearance. He is well-developed.   Cardiovascular:      Rate and Rhythm: Normal rate and regular rhythm.      Heart sounds: Normal heart sounds.   Pulmonary:      Effort: Pulmonary effort is normal.      Breath sounds: Normal breath sounds.   Musculoskeletal:      Right lower leg: No edema.      Left lower leg: No edema.   Skin:     General: Skin is warm and dry. " History  Chief Complaint   Patient presents with    Thumb Injury     pt co of pain and swelling to R thumb post injuring it yesterday      HPI patient is a 52-year-old male, status post splinter in his right thumb yesterday,   Patient reports now he was working with some wood on a deck an unfortunate get a piece of wood into his right thumb  Patient reports he can feel the sensation of the foreign body within the puncture wound  He reports he has become red and swollen that area  He reports redness and swelling up his arm  He denies any fever or chills  Patient reports he came to emergency department for removal of the foreign body and also was concerned about the localized infection  Past medical history HIV positive  Family history noncontributory  Social history, employed, smoker, right handed    None       Past Medical History:   Diagnosis Date    [facility]     Hyperlipidemia        History reviewed  No pertinent surgical history  History reviewed  No pertinent family history  I have reviewed and agree with the history as documented  Social History   Substance Use Topics    Smoking status: Current Every Day Smoker     Packs/day: 0 50     Types: Cigarettes    Smokeless tobacco: Current User     Types: Chew    Alcohol use No        Review of Systems   Constitutional: Negative for fever  HENT: Negative for congestion  Eyes: Negative for pain and redness  Respiratory: Negative for cough and shortness of breath  Cardiovascular: Negative for chest pain  Gastrointestinal: Negative for abdominal pain and vomiting  Skin: Positive for wound  Puncture wound with foreign body    Physical Exam  Physical Exam   Constitutional: He appears well-developed and well-nourished  Skin:   There is a puncture wound on the ulnar aspect of the right thumb, there is redness induration there is a lymphatic streak up the hand to the forearm    Distal   Neurological:      General: No focal deficit present.      Mental Status: He is alert.   Psychiatric:         Attention and Perception: Attention normal.         Mood and Affect: Mood and affect normal.         Behavior: Behavior normal.          Result Review :                Assessment and Plan    Diagnoses and all orders for this visit:    1. Other fatigue (Primary)  Assessment & Plan:  Further treatment pending lab results and follow-up in 6 weeks or sooner if concerns.    Orders:  -     TSH; Future  -     CBC w AUTO Differential; Future  -     Comprehensive metabolic panel; Future  -     Vitamin B12; Future    2. Anxiety  Assessment & Plan:  Does not recall doing well with Zoloft as a child.  That could be tolerated differently as an adult, however he does report history of ADD or ADHD and is a current smoker.  Discussed different treatment options including SSRI or Wellbutrin.  Elects to try Wellbutrin once daily extended release rather than starting with the regular release tablets.  He is to report any worsening of mood and follow-up in 6 weeks or sooner if concerns.  Encourage counseling and behavioral modification for management of anxiety and depression.  Warned of the rare occurrence of decreased seizure threshold when on Wellbutrin.  Hydroxyzine may be taken as needed at bedtime to assist with sleep.  I recommend that he not start both medicines in the same 24 to 48-hour.  So that if side effects were to occur would be easier to pinpoint the cause of the side effect.    Orders:  -     buPROPion XL (Wellbutrin XL) 150 MG 24 hr tablet; Take 1 tablet by mouth Daily.  Dispense: 30 tablet; Refill: 2  -     hydrOXYzine (ATARAX) 25 MG tablet; Take 1 tablet by mouth At Night As Needed for Itching.  Dispense: 30 tablet; Refill: 2    3. Nicotine dependence, cigarettes, uncomplicated  Assessment & Plan:  Trial of Wellbutrin.    Orders:  -     buPROPion XL (Wellbutrin XL) 150 MG 24 hr tablet; Take 1 tablet by  neurovascular tendon intact  There is a fullness in the area of the puncture consistent with foreign body  Patient reports he feels a foreign body in that area  Vital Signs  ED Triage Vitals [11/02/18 1417]   Temperature Pulse Respirations Blood Pressure SpO2   98 1 °F (36 7 °C) 95 16 129/83 96 %      Temp Source Heart Rate Source Patient Position - Orthostatic VS BP Location FiO2 (%)   Oral Monitor Sitting Right arm --      Pain Score       --           Vitals:    11/02/18 1417   BP: 129/83   Pulse: 95   Patient Position - Orthostatic VS: Sitting       Visual Acuity      ED Medications  Medications   lidocaine (PF) (XYLOCAINE-MPF) 2 % injection 10 mL (10 mL Infiltration Given 11/2/18 1429)   cephalexin (KEFLEX) capsule 500 mg (500 mg Oral Given 11/2/18 1429)       Diagnostic Studies  Results Reviewed     None                 No orders to display              Procedures  Incision/Drainage  Date/Time: 11/2/2018 2:20 PM  Performed by: Kala Funes  Authorized by: Kala Funes     Patient location:  ED  Consent:     Consent obtained:  Verbal    Consent given by:  Patient    Risks discussed:  Bleeding and incomplete drainage    Alternatives discussed:  No treatment  Universal protocol:     Patient identity confirmed:  Verbally with patient  Location:     Type:  Abscess    Size:  2  Anesthesia (see MAR for exact dosages): Anesthesia method:  Local infiltration    Local anesthetic:  Lidocaine 1% w/o epi  Procedure details:     Complexity:  Simple    Scalpel blade:  11    Incision depth:  Subcutaneous  Comments:      Patient has an abscess in his right thumb, associated with a wooden foreign body  Area was opened with a x shaped incision  Small wooden foreign body was removed  No other foreign body palpable  Discussed with patient the potential for retained foreign body  Discussed leaving the wound open for drainage  Discussed antibiotics to cover infection             Phone Contacts  ED Phone Contact    ED Course                               MDM medical decision making 52-year-old male status post puncture wound to his right thumb with a wooden foreign body, anesthetized with lidocaine, opened and removed foreign body, pus was drained  Discussed outpatient treatment and follow-up, discussed indications to return  Discussed possibility of retained foreign body  I do not see any other additional foreign body at this time but he could have retained splinter  We discussed the need to keep the wound open and any other foreign material may come out, discussed indications to return  CritCare Time    Disposition  Final diagnoses:   Foreign body of right thumb, initial encounter - Wooden foreign body removal , secondary infection     Time reflects when diagnosis was documented in both MDM as applicable and the Disposition within this note     Time User Action Codes Description Comment    11/2/2018  2:44 PM Olivia Dural Foreign body of right thumb, initial encounter     11/2/2018  2:45 PM Ezequiel Reyez Modify [I32 439N] Foreign body of right thumb, initial encounter Wooden foreign body removal , secondary infection      ED Disposition     ED Disposition Condition Comment    Discharge  Luis Fernando Barefoot discharge to home/self care  Condition at discharge: Good        Follow-up Information     Follow up With Specialties Details Why Contact Info Additional Information    [facility] Emergency Medicine   [address] [facility] [address]  [phone] [address] [facility] ED, [address] [place] [address], [id number]          Discharge Medication List as of 11/2/2018  2:47 PM      START taking these medications    Details   cephalexin (KEFLEX) 500 mg capsule Take 1 capsule (500 mg total) by mouth every 6 (six) hours for 10 days, Starting Fri 11/2/2018, Until Mon 11/12/2018, Print           No discharge procedures on file      ED mouth Daily.  Dispense: 30 tablet; Refill: 2    4. Screening cholesterol level  -     Lipid panel; Future      Follow Up    Return in about 6 weeks (around 9/13/2022).  Patient was given instructions and counseling regarding his condition or for health maintenance advice. Please see specific information pulled into the AVS if appropriate.    Provider  Electronically Signed by           Denzel Dubin, MD  11/02/18 8939

## 2023-07-11 ENCOUNTER — HOSPITAL ENCOUNTER (EMERGENCY)
Facility: HOSPITAL | Age: 53
Discharge: HOME | End: 2023-07-11
Attending: EMERGENCY MEDICINE
Payer: COMMERCIAL

## 2023-07-11 VITALS
SYSTOLIC BLOOD PRESSURE: 120 MMHG | BODY MASS INDEX: 24.07 KG/M2 | DIASTOLIC BLOOD PRESSURE: 87 MMHG | TEMPERATURE: 98 F | HEART RATE: 100 BPM | WEIGHT: 163 LBS | OXYGEN SATURATION: 98 % | RESPIRATION RATE: 18 BRPM

## 2023-07-11 DIAGNOSIS — Z71.1 PHYSICALLY WELL BUT WORRIED: Primary | ICD-10-CM

## 2023-07-11 LAB
ALBUMIN SERPL BCP-MCNC: 4.4 G/DL (ref 3.5–5)
ALP SERPL-CCNC: 52 U/L (ref 34–104)
ALT SERPL W P-5'-P-CCNC: 23 U/L (ref 7–52)
ANION GAP SERPL CALCULATED.3IONS-SCNC: 8 MMOL/L
AST SERPL W P-5'-P-CCNC: 14 U/L (ref 13–39)
BASOPHILS # BLD AUTO: 0.08 THOUSANDS/ÂΜL (ref 0–0.1)
BASOPHILS NFR BLD AUTO: 1 % (ref 0–1)
BILIRUB SERPL-MCNC: 0.45 MG/DL (ref 0.2–1)
BUN SERPL-MCNC: 16 MG/DL (ref 5–25)
CALCIUM SERPL-MCNC: 10.1 MG/DL (ref 8.4–10.2)
CARDIAC TROPONIN I PNL SERPL HS: 2 NG/L
CHLORIDE SERPL-SCNC: 105 MMOL/L (ref 96–108)
CO2 SERPL-SCNC: 25 MMOL/L (ref 21–32)
CREAT SERPL-MCNC: 1.33 MG/DL (ref 0.6–1.3)
EOSINOPHIL # BLD AUTO: 0.27 THOUSAND/ÂΜL (ref 0–0.61)
EOSINOPHIL NFR BLD AUTO: 5 % (ref 0–6)
ERYTHROCYTE [DISTWIDTH] IN BLOOD BY AUTOMATED COUNT: 12.4 % (ref 11.6–15.1)
GFR SERPL CREATININE-BSD FRML MDRD: 60 ML/MIN/1.73SQ M
GLUCOSE SERPL-MCNC: 114 MG/DL (ref 65–140)
HCT VFR BLD AUTO: 48.3 % (ref 36.5–49.3)
HGB BLD-MCNC: 17 G/DL (ref 12–17)
IMM GRANULOCYTES # BLD AUTO: 0.01 THOUSAND/UL (ref 0–0.2)
IMM GRANULOCYTES NFR BLD AUTO: 0 % (ref 0–2)
LYMPHOCYTES # BLD AUTO: 1.94 THOUSANDS/ÂΜL (ref 0.6–4.47)
LYMPHOCYTES NFR BLD AUTO: 33 % (ref 14–44)
MCH RBC QN AUTO: 33.5 PG (ref 26.8–34.3)
MCHC RBC AUTO-ENTMCNC: 35.2 G/DL (ref 31.4–37.4)
MCV RBC AUTO: 95 FL (ref 82–98)
MONOCYTES # BLD AUTO: 0.42 THOUSAND/ÂΜL (ref 0.17–1.22)
MONOCYTES NFR BLD AUTO: 7 % (ref 4–12)
NEUTROPHILS # BLD AUTO: 3.23 THOUSANDS/ÂΜL (ref 1.85–7.62)
NEUTS SEG NFR BLD AUTO: 54 % (ref 43–75)
NRBC BLD AUTO-RTO: 0 /100 WBCS
PLATELET # BLD AUTO: 267 THOUSANDS/UL (ref 149–390)
PMV BLD AUTO: 9.5 FL (ref 8.9–12.7)
POTASSIUM SERPL-SCNC: 3.9 MMOL/L (ref 3.5–5.3)
PROT SERPL-MCNC: 7.9 G/DL (ref 6.4–8.4)
RBC # BLD AUTO: 5.08 MILLION/UL (ref 3.88–5.62)
SODIUM SERPL-SCNC: 138 MMOL/L (ref 135–147)
WBC # BLD AUTO: 5.95 THOUSAND/UL (ref 4.31–10.16)

## 2023-07-11 PROCEDURE — 80053 COMPREHEN METABOLIC PANEL: CPT | Performed by: EMERGENCY MEDICINE

## 2023-07-11 PROCEDURE — 84484 ASSAY OF TROPONIN QUANT: CPT | Performed by: EMERGENCY MEDICINE

## 2023-07-11 PROCEDURE — 85025 COMPLETE CBC W/AUTO DIFF WBC: CPT | Performed by: EMERGENCY MEDICINE

## 2023-07-11 PROCEDURE — 36415 COLL VENOUS BLD VENIPUNCTURE: CPT

## 2023-07-11 NOTE — ED PROVIDER NOTES
History  Chief Complaint   Patient presents with   • Abnormal Lab     Patient reports got routine blood work done today and was called and told his potassium is high. Patient states he feels "fine" denies symptoms. 49-year-old male patient presents emergency department for evaluation of lab abnormalities. The patient is told to come to the emergency department for evaluation of a potassium level of 8. Repeat labs were done and the patient, who is completely asymptomatic, has normal potassium of 3.9. Patient has no other complaint or abnormalities on physical exam.      History provided by:  Patient   used: No    Medical Problem  Severity:  Mild  Onset quality:  Gradual  Timing:  Constant  Progression:  Worsening  Chronicity:  New  Associated symptoms: no cough, no myalgias and no sore throat        Prior to Admission Medications   Prescriptions Last Dose Informant Patient Reported? Taking?    Derma-Smoothe/FS Scalp 0.01 % OIL  Self Yes No   Sig: APPLY to scalp TWICE DAILY   atorvastatin (LIPITOR) 10 mg tablet  Self Yes No   Sig: Take 10 mg by mouth daily   benzonatate (TESSALON PERLES) 100 mg capsule   No No   Sig: Take 1 capsule (100 mg total) by mouth 3 (three) times a day as needed for cough   bictegravir-emtricitab-tenofovir alafenamide (BIKTARVY) -25 MG tablet  Self Yes No   Sig: Take 1 tablet by mouth daily with breakfast   docusate sodium (COLACE) 100 mg capsule   No No   Sig: Take 1 capsule (100 mg total) by mouth 3 (three) times a day for 7 days   folic acid (FOLVITE) 1 mg tablet  Self Yes No   Sig: Take 1,000 mcg by mouth daily   guaiFENesin 400 mg   No No   Sig: Take 1 tablet (400 mg total) by mouth every 6 (six) hours as needed for cough or congestion   oxybutynin (DITROPAN) 5 mg tablet   No No   Sig: Take 1 tablet (5 mg total) by mouth 2 (two) times a day as needed (stent colic and pain) for up to 7 days   sildenafil (VIAGRA) 50 MG tablet  Self Yes No   Sig: TAKE 1 TABLET AS NEEDED 1 HOUR BEFORE SEX DO NOT TAKE MORE THAN 1 TIME IN 48 HOURS THIS IS NOT A DAILY MEDICINE   tamsulosin (FLOMAX) 0.4 mg  Self No No   Sig: Take 1 capsule (0.4 mg total) by mouth daily with dinner   Patient not taking: Reported on 7/29/2021      Facility-Administered Medications: None       Past Medical History:   Diagnosis Date   • HIV (human immunodeficiency virus infection) ([address])    • Hyperlipidemia        Past Surgical History:   Procedure Laterality Date   • FL RETROGRADE PYELOGRAM  6/27/2019   • MASS EXCISION Right     knee   • FL CYSTO/URETERO W/LITHOTRIPSY &INDWELL STENT INSRT Right 6/27/2019    Procedure: CYSTOSCOPY URETEROSCOPY, RETROGRADE PYELOGRAM AND INSERTION STENT URETERAL WITH STONE BASKETTING;  Surgeon: Nieves Brizuela MD;  Location: [facility] [place] OR;  Service: Urology       History reviewed. No pertinent family history. I have reviewed and agree with the history as documented. E-Cigarette/Vaping     E-Cigarette/Vaping Substances     Social History     Tobacco Use   • Smoking status: Every Day     Packs/day: 0.50     Types: Cigarettes   • Smokeless tobacco: Current     Types: Chew   Substance Use Topics   • Alcohol use: Yes     Alcohol/week: 12.0 standard drinks of alcohol     Types: 12 Cans of beer per week   • Drug use: Not Currently     Frequency: 4.0 times per week     Types: Marijuana     Comment: last use 6/25/19       Review of Systems   HENT: Negative for sore throat. Respiratory: Negative for cough. Musculoskeletal: Negative for myalgias. All other systems reviewed and are negative. Physical Exam  Physical Exam  Vitals and nursing note reviewed. Constitutional:       General: He is not in acute distress. Appearance: He is well-developed. He is not diaphoretic. HENT:      Head: Normocephalic and atraumatic. Right Ear: External ear normal.      Left Ear: External ear normal.   Eyes:      General: No scleral icterus. Right eye: No discharge. Left eye: No discharge. Conjunctiva/sclera: Conjunctivae normal.   Neck:      Thyroid: No thyromegaly. Vascular: No JVD. Trachea: No tracheal deviation. Cardiovascular:      Rate and Rhythm: Normal rate and regular rhythm. Pulmonary:      Effort: Pulmonary effort is normal. No respiratory distress. Breath sounds: Normal breath sounds. No stridor. No wheezing or rales. Abdominal:      General: Bowel sounds are normal. There is no distension. Palpations: Abdomen is soft. Tenderness: There is no abdominal tenderness. Musculoskeletal:         General: No tenderness or deformity. Normal range of motion. Cervical back: Normal range of motion and neck supple. Skin:     General: Skin is warm and dry. Neurological:      Mental Status: He is alert and oriented to person, place, and time. Cranial Nerves: No cranial nerve deficit.       Coordination: Coordination normal.   Psychiatric:         Behavior: Behavior normal.         Vital Signs  ED Triage Vitals [07/11/23 1157]   Temperature Pulse Respirations Blood Pressure SpO2   98 °F (36.7 °C) 100 18 120/87 98 %      Temp src Heart Rate Source Patient Position - Orthostatic VS BP Location FiO2 (%)   -- Monitor -- -- --      Pain Score       --           Vitals:    07/11/23 1157   BP: 120/87   Pulse: 100         Visual Acuity      ED Medications  Medications - No data to display    Diagnostic Studies  Results Reviewed     Procedure Component Value Units Date/Time    HS Troponin 0hr (reflex protocol) [[id number]]  (Normal) Collected: 07/11/23 1202    Lab Status: Final result Specimen: Blood from Arm, Right Updated: 07/11/23 1245     hs TnI 0hr 2 ng/L     HS Troponin I 2hr [[id number]]     Lab Status: No result Specimen: Blood     HS Troponin I 4hr [[id number]]     Lab Status: No result Specimen: Blood     Comprehensive metabolic panel [[id number]]  (Abnormal) Collected: 07/11/23 1202    Lab Status: Final result Specimen: Blood from Arm, Right Updated: 07/11/23 1241     Sodium 138 mmol/L      Potassium 3.9 mmol/L      Chloride 105 mmol/L      CO2 25 mmol/L      ANION GAP 8 mmol/L      BUN 16 mg/dL      Creatinine 1.33 mg/dL      Glucose 114 mg/dL      Calcium 10.1 mg/dL      AST 14 U/L      ALT 23 U/L      Alkaline Phosphatase 52 U/L      Total Protein 7.9 g/dL      Albumin 4.4 g/dL      Total Bilirubin 0.45 mg/dL      eGFR 60 ml/min/1.73sq m     Narrative:      National Kidney Disease Foundation guidelines for Chronic Kidney Disease (CKD):   •  Stage 1 with normal or high GFR (GFR > 90 mL/min/1.73 square meters)  •  Stage 2 Mild CKD (GFR = 60-89 mL/min/1.73 square meters)  •  Stage 3A Moderate CKD (GFR = 45-59 mL/min/1.73 square meters)  •  Stage 3B Moderate CKD (GFR = 30-44 mL/min/1.73 square meters)  •  Stage 4 Severe CKD (GFR = 15-29 mL/min/1.73 square meters)  •  Stage 5 End Stage CKD (GFR <15 mL/min/1.73 square meters)  Note: GFR calculation is accurate only with a steady state creatinine    CBC and differential [[id number]] Collected: 07/11/23 1202    Lab Status: Final result Specimen: Blood from Arm, Right Updated: 07/11/23 1212     WBC 5.95 Thousand/uL      RBC 5.08 Million/uL      Hemoglobin 17.0 g/dL      Hematocrit 48.3 %      MCV 95 fL      MCH 33.5 pg      MCHC 35.2 g/dL      RDW 12.4 %      MPV 9.5 fL      Platelets 575 Thousands/uL      nRBC 0 /100 WBCs      Neutrophils Relative 54 %      Immat GRANS % 0 %      Lymphocytes Relative 33 %      Monocytes Relative 7 %      Eosinophils Relative 5 %      Basophils Relative 1 %      Neutrophils Absolute 3.23 Thousands/µL      Immature Grans Absolute 0.01 Thousand/uL      Lymphocytes Absolute 1.94 Thousands/µL      Monocytes Absolute 0.42 Thousand/µL      Eosinophils Absolute 0.27 Thousand/µL      Basophils Absolute 0.08 Thousands/µL                  No orders to display              Procedures  Procedures         ED Course                                             Medical Decision Making  Amount and/or Complexity of Data Reviewed  Labs: ordered. Disposition  Final diagnoses:   Physically well but worried     Time reflects when diagnosis was documented in both MDM as applicable and the Disposition within this note     Time User Action Codes Description Comment    7/11/2023 12:55 PM Ganga Brantley Add [Z71.1] Physically well but worried       ED Disposition     ED Disposition   Discharge    Condition   Stable    Date/Time   Tue Jul 11, 2023 12:55 PM    Comment   Manoj Chavarria discharge to home/self care. Follow-up Information     Follow up With Specialties Details Why Contact Info Additional Information    [address] [place] [address] Emergency Department Emergency Medicine   [address] [place] [address] Emergency Department, [place], [place], [facility]          Patient's Medications   Discharge Prescriptions    No medications on file       No discharge procedures on file.     PDMP Review     None          ED Provider  Electronically Signed by           Michaela Rabago DO  07/11/23 2879

## 2025-01-24 ENCOUNTER — TELEPHONE (OUTPATIENT)
Age: 55
End: 2025-01-24

## 2025-01-24 NOTE — TELEPHONE ENCOUNTER
Patients GI provider:  New Patient    Number to return call: [phone]    Reason for call: Baldemar from [employer] [facility] in [place] called in to obtain information to start a colon consultation referral for patient.  Baldemar is requesting a transportation wavier to be fax over to [phone]. [employer] services will call back to schedule colon consult when patient comes in for appt. Thank you.    Scheduled procedure/appointment date if applicable: Apt/procedure n/a

## 2025-01-28 NOTE — TELEPHONE ENCOUNTER
"Tried to call Juldaniela at [phone] - recording stating  \"The number you are trying to call is no longer in service, not working, or disconnected. Please check number\"     Tried to call to get clarification of message. We do not do referrals to [facility].  We do referrals for patient to see other providers or have test.   "

## 2025-01-31 NOTE — TELEPHONE ENCOUNTER
Tried to call the number in previous message, [phone], The number is either CHANGE,DISCONNECTED, or NO LONGER in SERVICE.

## (undated) DEVICE — PACK TUR

## (undated) DEVICE — LUBRICANT SURGILUBE TUBE 4 OZ  FLIP TOP

## (undated) DEVICE — SHEATH URETERAL ACCESS 12/14FR 35CM PROXIS

## (undated) DEVICE — GLOVE INDICATOR PI UNDERGLOVE SZ 8 BLUE

## (undated) DEVICE — UROCATCH BAG

## (undated) DEVICE — BASKET SPECIMEN RETRIVAL 1.9FR 120CM

## (undated) DEVICE — GUIDEWIRE STRGHT TIP 0.035 IN  SOLO PLUS

## (undated) DEVICE — CHLORHEXIDINE 4PCT 4 OZ

## (undated) DEVICE — CATH URETERAL 5FR X 70 CM FLEX TIP POLYUR BARD

## (undated) DEVICE — GLOVE SRG BIOGEL ECLIPSE 7.5

## (undated) DEVICE — SPONGE 4 X 4 XRAY 16 PLY STRL LF RFD

## (undated) DEVICE — INVIEW CLEAR LEGGINGS: Brand: CONVERTORS

## (undated) DEVICE — SCD SEQUENTIAL COMPRESSION COMFORT SLEEVE MEDIUM KNEE LENGTH: Brand: KENDALL SCD